# Patient Record
Sex: FEMALE | Race: WHITE | Employment: OTHER | URBAN - METROPOLITAN AREA
[De-identification: names, ages, dates, MRNs, and addresses within clinical notes are randomized per-mention and may not be internally consistent; named-entity substitution may affect disease eponyms.]

---

## 2017-01-11 PROBLEM — H25.811 COMBINED FORMS OF AGE-RELATED CATARACT OF RIGHT EYE: Status: ACTIVE | Noted: 2017-01-11

## 2017-01-16 NOTE — PERIOP NOTES
[facility]  Ambulatory Surgery Unit  Pre-operative Instructions    Surgery/Procedure Date  01/18/2017            Tentative Arrival Time 0645      1. On the day of your surgery/procedure, please report to the Ambulatory Surgery Unit Registration Desk and sign in at your designated time. The Ambulatory Surgery Unit is located in [facility] on the [facility] side of the [facility] across from the [address] [place] [address]. Please have all of your health insurance cards and a photo ID. 2. You must have someone with you to drive you home, as you should not drive a car for 24 hours following anesthesia. Please make arrangements for a responsible adult friend or family member to stay with you for at least the first 24 hours after your surgery. 3. Do not have anything to eat or drink (including water, gum, mints, coffee, juice) after midnight   01/17/2017  . This may not apply to medications prescribed by your physician. (Please note below the special instructions with medications to take the morning of surgery, if applicable.)    4. We recommend you do not drink any alcoholic beverages for 24 hours before and after your surgery. 5. Stop all Aspirin and non-steroidal anti-inflammatory drugs (i.e. Advil, Aleve) as directed by your surgeon's office. Stop all vitamins and herbal supplements seven days prior to your surgery. If you are currently taking Plavix, Coumadin, or other blood-thinning agents, contact your surgeon for instructions. 6. In an effort to help prevent surgical site infection, we ask that you shower with an anti-bacterial soap (i.e. Dial or Safeguard) for 3 days prior to and on the morning of surgery, using a fresh towel after each shower. Do not apply any lotions, powders or deodorants after the shower on the day of your procedure. If applicable, please do not shave the operative site for 48 hours prior to surgery. 7. Wear comfortable clothes.   Wear glasses instead of contacts. Do not bring any money or jewelry. Do not wear make-up, particularly mascara, the morning of your surgery. Do not wear nail polish, particularly if you are having foot /hand surgery. Wear your hair loose or down, no ponytails, buns, malcolm pins or clips. All body piercings must be removed. 8. You should understand that if you do not follow these instructions your surgery may be cancelled. If your physical condition changes (i.e. fever, cold or flu) please contact your surgeon as soon as possible. 9. It is important that you be on time. If a situation occurs where you may be late, or if you have any questions or problems, please call [phone].    10. Your surgery time may be subject to change. You will receive a phone call the day prior to surgery to confirm your arrival time. 11. Pediatric patients: please bring a change of clothes, diapers, bottle/sippy cup, pacifier, etc.      Special Instructions:    MEDICATIONS TO TAKE THE MORNING OF SURGERY WITH A SIP OF WATER: Vigamox      I understand a pre-operative phone call will be made to verify my surgery time. In the event that I am not available, I give permission for a message to be left on my answering service and/or with another person? yes         ___________________      ___________________  _________  Pt verbalized understanding of preop instructions via telephone.   (Signature of Patient)          (Witness)                              (Date and Time)

## 2017-01-17 ENCOUNTER — ANESTHESIA EVENT (OUTPATIENT)
Dept: SURGERY | Age: 72
End: 2017-01-17
Payer: MEDICARE

## 2017-01-18 ENCOUNTER — HOSPITAL ENCOUNTER (OUTPATIENT)
Age: 72
Setting detail: OUTPATIENT SURGERY
Discharge: HOME | End: 2017-01-18
Attending: OPHTHALMOLOGY | Admitting: OPHTHALMOLOGY
Payer: MEDICARE

## 2017-01-18 ENCOUNTER — SURGERY (OUTPATIENT)
Age: 72
End: 2017-01-18

## 2017-01-18 ENCOUNTER — ANESTHESIA (OUTPATIENT)
Dept: SURGERY | Age: 72
End: 2017-01-18
Payer: MEDICARE

## 2017-01-18 VITALS
SYSTOLIC BLOOD PRESSURE: 174 MMHG | OXYGEN SATURATION: 98 % | TEMPERATURE: 98.6 F | HEART RATE: 61 BPM | HEIGHT: 62 IN | BODY MASS INDEX: 32.94 KG/M2 | RESPIRATION RATE: 21 BRPM | WEIGHT: 179 LBS | DIASTOLIC BLOOD PRESSURE: 80 MMHG

## 2017-01-18 PROBLEM — H25.812 COMBINED FORMS OF AGE-RELATED CATARACT OF LEFT EYE: Status: ACTIVE | Noted: 2017-01-18

## 2017-01-18 PROBLEM — H25.811 COMBINED FORMS OF AGE-RELATED CATARACT OF RIGHT EYE: Status: RESOLVED | Noted: 2017-01-11 | Resolved: 2017-01-18

## 2017-01-18 PROCEDURE — V2632 POST CHMBR INTRAOCULAR LENS: HCPCS | Performed by: OPHTHALMOLOGY

## 2017-01-18 PROCEDURE — 76060000073 HC AMB SURG ANES FIRST 0.5 HR: Performed by: OPHTHALMOLOGY

## 2017-01-18 PROCEDURE — 76030000012 HC AMB SURG OR TIME 6 TO 6.5: Performed by: OPHTHALMOLOGY

## 2017-01-18 PROCEDURE — 76030000002 HC AMB SURG OR TIME FIRST 0.: Performed by: OPHTHALMOLOGY

## 2017-01-18 PROCEDURE — 76210000046 HC AMBSU PH II REC FIRST 0.5 HR: Performed by: OPHTHALMOLOGY

## 2017-01-18 PROCEDURE — 76060000074: Performed by: OPHTHALMOLOGY

## 2017-01-18 PROCEDURE — 74011000250 HC RX REV CODE- 250: Performed by: OPHTHALMOLOGY

## 2017-01-18 PROCEDURE — 74011250636 HC RX REV CODE- 250/636: Performed by: OPHTHALMOLOGY

## 2017-01-18 PROCEDURE — 74011250636 HC RX REV CODE- 250/636

## 2017-01-18 PROCEDURE — 77030018846 HC SOL IRR STRL H20 ICUM -A: Performed by: OPHTHALMOLOGY

## 2017-01-18 PROCEDURE — 76060000071 HC AMB SURG ANES 6 TO 6.5 HR: Performed by: OPHTHALMOLOGY

## 2017-01-18 DEVICE — LENS IOL POST 1-PC 6X13 18.5 -- ACRYSOF: Type: IMPLANTABLE DEVICE | Site: EYE | Status: FUNCTIONAL

## 2017-01-18 RX ORDER — FENTANYL CITRATE 50 UG/ML
25 INJECTION, SOLUTION INTRAMUSCULAR; INTRAVENOUS
Status: DISCONTINUED | OUTPATIENT
Start: 2017-01-18 | End: 2017-01-18 | Stop reason: HOSPADM

## 2017-01-18 RX ORDER — SODIUM CHLORIDE, SODIUM LACTATE, POTASSIUM CHLORIDE, CALCIUM CHLORIDE 600; 310; 30; 20 MG/100ML; MG/100ML; MG/100ML; MG/100ML
25 INJECTION, SOLUTION INTRAVENOUS CONTINUOUS
Status: DISCONTINUED | OUTPATIENT
Start: 2017-01-18 | End: 2017-01-18 | Stop reason: HOSPADM

## 2017-01-18 RX ORDER — LIDOCAINE HYDROCHLORIDE 10 MG/ML
0.5 INJECTION, SOLUTION EPIDURAL; INFILTRATION; INTRACAUDAL; PERINEURAL ONCE
Status: COMPLETED | OUTPATIENT
Start: 2017-01-18 | End: 2017-01-18

## 2017-01-18 RX ORDER — GUAIFENESIN 100 MG/5ML
81 LIQUID (ML) ORAL DAILY
COMMUNITY
End: 2020-01-27

## 2017-01-18 RX ORDER — PHENYLEPHRINE HYDROCHLORIDE 25 MG/ML
1 SOLUTION/ DROPS OPHTHALMIC
Status: COMPLETED | OUTPATIENT
Start: 2017-01-18 | End: 2017-01-18

## 2017-01-18 RX ORDER — SODIUM CHLORIDE 0.9 % (FLUSH) 0.9 %
5-10 SYRINGE (ML) INJECTION AS NEEDED
Status: DISCONTINUED | OUTPATIENT
Start: 2017-01-18 | End: 2017-01-18 | Stop reason: HOSPADM

## 2017-01-18 RX ORDER — DICLOFENAC SODIUM 1 MG/ML
1 SOLUTION/ DROPS OPHTHALMIC
Status: COMPLETED | OUTPATIENT
Start: 2017-01-18 | End: 2017-01-18

## 2017-01-18 RX ORDER — MOXIFLOXACIN 5 MG/ML
1 SOLUTION/ DROPS OPHTHALMIC 4 TIMES DAILY
COMMUNITY
End: 2020-01-27

## 2017-01-18 RX ORDER — PREDNISOLONE ACETATE 10 MG/ML
1 SUSPENSION/ DROPS OPHTHALMIC 2 TIMES DAILY
Status: DISCONTINUED | OUTPATIENT
Start: 2017-01-18 | End: 2017-01-18 | Stop reason: HOSPADM

## 2017-01-18 RX ORDER — GENTAMICIN SULFATE 0.3 %
0.5 OINTMENT (GRAM) OPHTHALMIC (EYE) 3 TIMES DAILY
Status: DISCONTINUED | OUTPATIENT
Start: 2017-01-18 | End: 2017-01-18 | Stop reason: HOSPADM

## 2017-01-18 RX ORDER — CYCLOPENTOLATE HYDROCHLORIDE 20 MG/ML
1 SOLUTION/ DROPS OPHTHALMIC
Status: COMPLETED | OUTPATIENT
Start: 2017-01-18 | End: 2017-01-18

## 2017-01-18 RX ORDER — LIDOCAINE HYDROCHLORIDE 10 MG/ML
0.1 INJECTION, SOLUTION EPIDURAL; INFILTRATION; INTRACAUDAL; PERINEURAL AS NEEDED
Status: DISCONTINUED | OUTPATIENT
Start: 2017-01-18 | End: 2017-01-18 | Stop reason: HOSPADM

## 2017-01-18 RX ORDER — MIDAZOLAM HYDROCHLORIDE 1 MG/ML
INJECTION, SOLUTION INTRAMUSCULAR; INTRAVENOUS AS NEEDED
Status: DISCONTINUED | OUTPATIENT
Start: 2017-01-18 | End: 2017-01-18 | Stop reason: HOSPADM

## 2017-01-18 RX ORDER — DIPHENHYDRAMINE HYDROCHLORIDE 50 MG/ML
12.5 INJECTION, SOLUTION INTRAMUSCULAR; INTRAVENOUS AS NEEDED
Status: DISCONTINUED | OUTPATIENT
Start: 2017-01-18 | End: 2017-01-18 | Stop reason: HOSPADM

## 2017-01-18 RX ORDER — SODIUM CHLORIDE 9 MG/ML
25 INJECTION, SOLUTION INTRAVENOUS CONTINUOUS
Status: DISCONTINUED | OUTPATIENT
Start: 2017-01-18 | End: 2017-01-18 | Stop reason: HOSPADM

## 2017-01-18 RX ORDER — LIDOCAINE HYDROCHLORIDE 40 MG/ML
3 INJECTION, SOLUTION RETROBULBAR; TOPICAL ONCE
Status: COMPLETED | OUTPATIENT
Start: 2017-01-18 | End: 2017-01-18

## 2017-01-18 RX ORDER — SODIUM CHLORIDE 0.9 % (FLUSH) 0.9 %
5-10 SYRINGE (ML) INJECTION EVERY 8 HOURS
Status: DISCONTINUED | OUTPATIENT
Start: 2017-01-18 | End: 2017-01-18 | Stop reason: HOSPADM

## 2017-01-18 RX ORDER — PROPARACAINE HYDROCHLORIDE 5 MG/ML
1 SOLUTION/ DROPS OPHTHALMIC
Status: COMPLETED | OUTPATIENT
Start: 2017-01-18 | End: 2017-01-18

## 2017-01-18 RX ORDER — ONDANSETRON 2 MG/ML
4 INJECTION INTRAMUSCULAR; INTRAVENOUS AS NEEDED
Status: DISCONTINUED | OUTPATIENT
Start: 2017-01-18 | End: 2017-01-18 | Stop reason: HOSPADM

## 2017-01-18 RX ADMIN — PHENYLEPHRINE HYDROCHLORIDE 1 DROP: 25 SOLUTION/ DROPS OPHTHALMIC at 06:58

## 2017-01-18 RX ADMIN — DICLOFENAC SODIUM 1 DROP: 1 SOLUTION/ DROPS OPHTHALMIC at 06:54

## 2017-01-18 RX ADMIN — LIDOCAINE HYDROCHLORIDE 3 ML: 40 INJECTION, SOLUTION RETROBULBAR; TOPICAL at 07:59

## 2017-01-18 RX ADMIN — CYCLOPENTOLATE HYDROCHLORIDE 1 DROP: 20 SOLUTION/ DROPS OPHTHALMIC at 06:58

## 2017-01-18 RX ADMIN — CYCLOPENTOLATE HYDROCHLORIDE 1 DROP: 20 SOLUTION/ DROPS OPHTHALMIC at 07:07

## 2017-01-18 RX ADMIN — PREDNISOLONE ACETATE 1 DROP: 10 SUSPENSION/ DROPS OPHTHALMIC at 08:01

## 2017-01-18 RX ADMIN — PROPARACAINE HYDROCHLORIDE 1 DROP: 5 SOLUTION/ DROPS OPHTHALMIC at 07:13

## 2017-01-18 RX ADMIN — APRACLONIDINE HYDROCHLORIDE 1 DROP: 10 SOLUTION/ DROPS OPHTHALMIC at 07:59

## 2017-01-18 RX ADMIN — PROPARACAINE HYDROCHLORIDE 1 DROP: 5 SOLUTION/ DROPS OPHTHALMIC at 07:06

## 2017-01-18 RX ADMIN — DICLOFENAC SODIUM 1 DROP: 1 SOLUTION/ DROPS OPHTHALMIC at 07:06

## 2017-01-18 RX ADMIN — SODIUM CHLORIDE 25 ML/HR: 900 INJECTION, SOLUTION INTRAVENOUS at 06:51

## 2017-01-18 RX ADMIN — MIDAZOLAM HYDROCHLORIDE 1 MG: 1 INJECTION, SOLUTION INTRAMUSCULAR; INTRAVENOUS at 07:59

## 2017-01-18 RX ADMIN — Medication 1 ML: at 07:59

## 2017-01-18 RX ADMIN — DICLOFENAC SODIUM 1 DROP: 1 SOLUTION/ DROPS OPHTHALMIC at 07:07

## 2017-01-18 RX ADMIN — PHENYLEPHRINE HYDROCHLORIDE 1 DROP: 25 SOLUTION/ DROPS OPHTHALMIC at 06:53

## 2017-01-18 RX ADMIN — PROPARACAINE HYDROCHLORIDE 1 DROP: 5 SOLUTION/ DROPS OPHTHALMIC at 06:53

## 2017-01-18 RX ADMIN — DICLOFENAC SODIUM 1 DROP: 1 SOLUTION/ DROPS OPHTHALMIC at 06:58

## 2017-01-18 RX ADMIN — CYCLOPENTOLATE HYDROCHLORIDE 1 DROP: 20 SOLUTION/ DROPS OPHTHALMIC at 07:05

## 2017-01-18 RX ADMIN — CYCLOPENTOLATE HYDROCHLORIDE 1 DROP: 20 SOLUTION/ DROPS OPHTHALMIC at 06:54

## 2017-01-18 RX ADMIN — PROPARACAINE HYDROCHLORIDE 1 DROP: 5 SOLUTION/ DROPS OPHTHALMIC at 07:05

## 2017-01-18 RX ADMIN — PHENYLEPHRINE HYDROCHLORIDE 1 DROP: 25 SOLUTION/ DROPS OPHTHALMIC at 07:05

## 2017-01-18 RX ADMIN — GENTAMICIN SULFATE 0.5 INCH: 3 OINTMENT OPHTHALMIC at 08:00

## 2017-01-18 RX ADMIN — PHENYLEPHRINE HYDROCHLORIDE 1 DROP: 25 SOLUTION/ DROPS OPHTHALMIC at 07:07

## 2017-01-18 RX ADMIN — MIDAZOLAM HYDROCHLORIDE 1 MG: 1 INJECTION, SOLUTION INTRAMUSCULAR; INTRAVENOUS at 07:44

## 2017-01-18 RX ADMIN — BALANCED SALT SOLUTION 1 BOTTLE: 6.4; .75; .48; .3; 3.9; 1.7 SOLUTION OPHTHALMIC at 07:59

## 2017-01-18 RX ADMIN — LIDOCAINE HYDROCHLORIDE 0.5 ML: 10 INJECTION, SOLUTION EPIDURAL; INFILTRATION; INTRACAUDAL; PERINEURAL at 07:59

## 2017-01-18 RX ADMIN — PROPARACAINE HYDROCHLORIDE 1 DROP: 5 SOLUTION/ DROPS OPHTHALMIC at 06:58

## 2017-01-18 RX ADMIN — ACETYLCHOLINE CHLORIDE 20 MG: KIT at 07:59

## 2017-01-18 RX ADMIN — VANCOMYCIN HYDROCHLORIDE: 1 INJECTION, POWDER, LYOPHILIZED, FOR SOLUTION INTRAVENOUS at 08:01

## 2017-01-18 NOTE — DISCHARGE INSTRUCTIONS
Laisha Crowley D.O., JOYCE  [facility].  [phone]    Post-Operative Instructions for Cataract Surgery     Remove your eye shield and bandage at 12 noon the same day as surgery and start your eye drops. THROW AWAY THE GAUZE UNDER THE SHIELD.  Place the blue eye shield back on for one week while asleep, even if napping in the recliner. Use the tape included in your bag.  DO NOT RUB YOUR EYE EVER! DO NOT WIPE YOUR EYE! ONLY WIPE ON YOUR CHEEK!                DO NOT WEAR EYE MAKEUP FOR 1 WEEK!  You may take a bath today and you can shower starting tomorrow.  You may resume your previous diet.  If you use glaucoma drops in the operative eye, continue them as directed.  Common symptoms after surgery include a scratchy feeling, slight headache, red eye, and/or blurred vision. You may use Advil or Tylenol for any discomfort.  Avoid strenuous activities and driving until you see Dr. Kandy Hernandez tomorrow. Please use care when walking, your depth perception may be altered.  Bring your bag with your drops to your follow up appointment. Below are Instructions On How To Use Your Eye Drops. ON THE DAY OF SURGERY:     Use all three eye drops at 12 noon, 4pm, and 8pm.  Wait 10 minutes between drops. THE DAY AFTER SURGERY:     You will use the drops 4 times a day at 8am, 12 noon, 4pm, and 8pm.   Use the drops every day until bottles are empty. Vigamox (tan top) Put 1 drop in at 8am, 12 noon, 4pm, and 8pm.    Pred Acetate (pink top) Put 1 drop in at 8:10am, 12:10pm, 4:10pm, and 8:10pm. Shake before using. Diclofenac Put 1 drop in at 8:20am, 12:20pm, 4:20pm, 8:20pm.    CONTINUE DROPS UNTIL ALL BOTTLES ARE EMPTY! Follow-up appointment tomorrow at Dr. Meme Roblero office:__________9:00__________    Please call the office at [phone] if you experience severe pain or nausea.        DISCHARGE SUMMARY from Nurse    The following personal items collected during your admission are returned to you:   Dental Appliance: Dental Appliances: Partials, Uppers, With patient  Vision: Visual Aid: Glasses, At home (reading glasses)  Hearing Aid:    Jewelry: Jewelry: None  Clothing: Clothing: Footwear (under stretcher)  Other Valuables: Other Valuables: None  Valuables sent to safe:      PATIENT INSTRUCTIONS:    After general anesthesia or intravenous sedation, for 24 hours or while taking prescription arcotics:  · Someone should be with you for the next 24 hours. · For your own safety, a responsible adult must drive you home. · Limit your activities  · Recommended activity: Rest today and no driving until after your appointment with Dr. Lakeisha Mccormick tomorrow. · Do not drive and operate hazardous machinery  · Do not make important personal or business decisions  · Do  not drink alcoholic beverages  · If you have not urinated within 8 hours after discharge, please contact your surgeon on call. Report the following to your surgeon:  · Excessive pain, swelling, redness or odor of or around the surgical area  · Temperature over 100.5  · Any nausea and vomiting   ·   · You will receive a Post Operative Call from one of the Recovery Room Nurses on the day after your surgery to check on you. It is very important for us to know how you are recovering after your surgery. · You may receive an e-mail or letter in the mail from [place] regarding your experience with us in the Ambulatory Surgery Unit. Your feedback is valuable to us and we appreciate your participation in the survey. ·   ·     · If the above instructions are not adequate, please contact Yumiko Brito RN, [place] anesthesia Nurse Manager or our Anesthesiologist, at [phone]. ·   · We wish you a speedy recovery ? What to do at Home:    *  Please give a list of your current medications to your Primary Care Provider.     *  Please update this list whenever your medications are discontinued, doses are changed, or new medications (including over-the-counter products) are added. *  Please carry medication information at all times in case of emergency situations. These are general instructions for a healthy lifestyle:    No smoking/ No tobacco products/ Avoid exposure to second hand smoke    Surgeon General's Warning:  Quitting smoking now greatly reduces serious risk to your health. Obesity, smoking, and sedentary lifestyle greatly increases your risk for illness    A healthy diet, regular physical exercise & weight monitoring are important for maintaining a healthy lifestyle    You may be retaining fluid if you have a history of heart failure or if you experience any of the following symptoms:  Weight gain of 3 pounds or more overnight or 5 pounds in a week, increased swelling in our hands or feet or shortness of breath while lying flat in bed. Please call your doctor as soon as you notice any of these symptoms; do not wait until your next office visit. Recognize signs and symptoms of STROKE:    F-face looks uneven    A-arms unable to move or move even    S-speech slurred or non-existent    T-time-call 911 as soon as signs and symptoms begin-DO NOT go       Back to bed or wait to see if you get better-TIME IS BRAIN. If you have not had your influenza or pneumococcal vaccines, please follow up with your primary care physician. The discharge information has been reviewed with the patient and caregiver. The patient and caregiver verbalized understanding.

## 2017-01-18 NOTE — ANESTHESIA POSTPROCEDURE EVALUATION
Post-Anesthesia Evaluation and Assessment    Patient: Lupe Kovacs MRN: [id number]  SSN: [id number]    YOB: 1945  Age: 70 y.o. Sex: female       Cardiovascular Function/Vital Signs  Visit Vitals    /80    Pulse 61    Temp 37 °C (98.6 °F)    Resp 21    Ht 5' 2\" (1.575 m)    Wt 81.2 kg (179 lb)    SpO2 98%    BMI 32.74 kg/m2       Patient is status post MAC anesthesia for Procedure(s):  CATARACT EXTRACTION WITH INTRA OCULAR LENS IMPLANT RIGHT EYE. Nausea/Vomiting: None    Postoperative hydration reviewed and adequate. Pain:  Pain Scale 1: Numeric (0 - 10) (01/18/17 0816)  Pain Intensity 1: 0 (01/18/17 0816)   Managed    Neurological Status:   Neuro (WDL): Exceptions to WDL (01/18/17 1085)  Neuro  Neurologic State: Alert (01/18/17 0814)  LUE Motor Response: Purposeful (01/18/17 0814)  LLE Motor Response: Purposeful (01/18/17 0814)  RUE Motor Response: Purposeful (01/18/17 7624)  RLE Motor Response: Purposeful (01/18/17 0814)   At baseline    Mental Status and Level of Consciousness: Arousable    Pulmonary Status:   O2 Device: Room air (Simultaneous filing. User may not have seen previous data.) (01/18/17 0814)   Adequate oxygenation and airway patent    Complications related to anesthesia: None    Post-anesthesia assessment completed.  No concerns    Signed By: Quinton Hardin MD     January 18, 2017

## 2017-01-18 NOTE — ANESTHESIA PREPROCEDURE EVALUATION
Anesthetic History   No history of anesthetic complications            Review of Systems / Medical History  Patient summary reviewed, nursing notes reviewed and pertinent labs reviewed    Pulmonary  Within defined limits                 Neuro/Psych   Within defined limits           Cardiovascular    Hypertension              Exercise tolerance: >4 METS     GI/Hepatic/Renal  Within defined limits              Endo/Other  Within defined limits           Other Findings              Physical Exam    Airway  Mallampati: III  TM Distance: 4 - 6 cm  Neck ROM: normal range of motion   Mouth opening: Normal     Cardiovascular    Rhythm: regular  Rate: normal      Pertinent negatives: No murmur   Dental    Dentition: Bridges  Comments: Upper left 4 molars   Pulmonary  Breath sounds clear to auscultation               Abdominal  GI exam deferred       Other Findings            Anesthetic Plan    ASA: 2  Anesthesia type: MAC          Induction: Intravenous  Anesthetic plan and risks discussed with: Patient

## 2017-01-18 NOTE — IP AVS SNAPSHOT
[address] [facility] 
[phone] Patient: Diann Perez MRN: [id number] UBA:8/3/1982 You are allergic to the following Allergen Reactions Cefuroxime Other (comments) Tightness in abd Recent Documentation Height Weight BMI OB Status Smoking Status 1.575 m 81.2 kg 32.74 kg/m2 Hysterectomy Never Smoker Emergency Contacts Name Discharge Info Relation Home Work Mobile [address] CAREGIVER [3] Child [2] [phone] About your hospitalization You were admitted on:  January 18, 2017 You last received care in the:  [facility] [employer] You were discharged on:  January 18, 2017 Unit phone number:  [phone] Why you were hospitalized Your primary diagnosis was:  Combined Forms Of Age-Related Cataract Of Right Eye  
  
  
 
  
  
Providers Seen During Your Hospitalizations Provider Role Specialty Primary office phone Juma Hensley DO Attending Provider Ophthalmology [phone] Your Primary Care Physician (PCP) Primary Care Physician Office Phone Office Fax Edyta Jimenez [phone] Follow-up Information Follow up With Details Comments Contact Info Leticia Nageotte, MD   [address] [address] 
[phone] Your Appointments Wednesday January 18, 2017 CATARACT EXTRACTION WITH INTRA OCULAR LENS IMPLANT with Juma Hensley DO  
[facility] AMB SURGERY UNIT (RI OR PRE ASSESSMENT) [address] [facility]  
[phone] Wednesday January 25, 2017 CATARACT EXTRACTION WITH INTRA OCULAR LENS IMPLANT with Juma Hensley DO  
[facility] AMB SURGERY UNIT (RI OR PRE ASSESSMENT) [address] [place] [address]  
[phone] Current Discharge Medication List  
  
ASK your doctor about these medications Dose & Instructions Dispensing Information Comments Morning Noon Evening Bedtime  
 aspirin 81 mg chewable tablet Your next dose is: Today, Tomorrow Other:  _________ Dose:  81 mg Take 81 mg by mouth daily. Refills:  0  
     
   
   
   
  
 lisinopril 40 mg tablet Commonly known as:  Marilynne Shows Your next dose is: Today, Tomorrow Other:  _________ Dose:  40 mg Take 40 mg by mouth daily. Refills:  0 METAMUCIL PO Your next dose is: Today, Tomorrow Other:  _________ Take  by mouth. Refills:  0  
     
   
   
   
  
 VIGAMOX 0.5 % ophthalmic solution Generic drug:  moxifloxacin Your next dose is: Today, Tomorrow Other:  _________ Dose:  1 Drop Administer 1 Drop to right eye three (3) times daily. Refills:  0 ZyrTEC 10 mg Cap Generic drug:  Cetirizine Your next dose is: Today, Tomorrow Other:  _________ Take  by mouth. Refills:  0 Discharge Instructions Mary Ann ANA LILIA Greene, P.CMitchel 
[facility] [address]. 
[phone] Post-Operative Instructions for Cataract Surgery ? Remove your eye shield and bandage at 12 noon the same day as surgery and start your eye drops. THROW AWAY THE GAUZE UNDER THE SHIELD. ? Place the blue eye shield back on for one week while asleep, even if napping in the recliner. Use the tape included in your bag.   
       
 
? DO NOT RUB YOUR EYE EVER! DO NOT WIPE YOUR EYE! ONLY WIPE ON YOUR CHEEK! 
 
            DO NOT WEAR EYE MAKEUP FOR 1 WEEK! 
 
 
? You may take a bath today and you can shower starting tomorrow. ? You may resume your previous diet. ? If you use glaucoma drops in the operative eye, continue them as directed.  
 
? Common symptoms after surgery include a scratchy feeling, slight headache, red eye, and/or blurred vision. You may use Advil or Tylenol for any discomfort. ? Avoid strenuous activities and driving until you see Dr. Megan Gordillo tomorrow. Please use care when walking, your depth perception may be altered. ? Bring your bag with your drops to your follow up appointment. Below are Instructions On How To Use Your Eye Drops. ON THE DAY OF SURGERY: 
 
? Use all three eye drops at 12 noon, 4pm, and 8pm.  Wait 10 minutes between drops. THE DAY AFTER SURGERY: 
 
? You will use the drops 4 times a day at 8am, 12 noon, 4pm, and 8pm. 
? Use the drops every day until bottles are empty. Vigamox (tan top) Put 1 drop in at 8am, 12 noon, 4pm, and 8pm. 
 
Pred Acetate (pink top) Put 1 drop in at 8:10am, 12:10pm, 4:10pm, and 8:10pm. Shake before using. Diclofenac Put 1 drop in at 8:20am, 12:20pm, 4:20pm, 8:20pm. 
 
CONTINUE DROPS UNTIL ALL BOTTLES ARE EMPTY! Follow-up appointment tomorrow at Dr. Alta Lester office:__________9:00__________ Please call the office at [phone] if you experience severe pain or nausea. DISCHARGE SUMMARY from Nurse The following personal items collected during your admission are returned to you:  
Dental Appliance: Dental Appliances: Partials, Uppers, With patient Vision: Visual Aid: Glasses, At home (reading glasses) Hearing Aid:   
Jewelry: Jewelry: None Clothing: Clothing: Footwear (under stretcher) Other Valuables: Other Valuables: None Valuables sent to safe:   
 
PATIENT INSTRUCTIONS: 
 
After general anesthesia or intravenous sedation, for 24 hours or while taking prescription arcotics: · Someone should be with you for the next 24 hours. · For your own safety, a responsible adult must drive you home. · Limit your activities · Recommended activity: Rest today and no driving until after your appointment with Dr. Megan Gordillo tomorrow. · Do not drive and operate hazardous machinery · Do not make important personal or business decisions · Do  not drink alcoholic beverages · If you have not urinated within 8 hours after discharge, please contact your surgeon on call. Report the following to your surgeon: 
· Excessive pain, swelling, redness or odor of or around the surgical area · Temperature over 100.5 · Any nausea and vomiting ·  
· You will receive a Post Operative Call from one of the Recovery Room Nurses on the day after your surgery to check on you. It is very important for us to know how you are recovering after your surgery. · You may receive an e-mail or letter in the mail from [place] regarding your experience with us in the Ambulatory Surgery Unit. Your feedback is valuable to us and we appreciate your participation in the survey. ·  
·  
 
· If the above instructions are not adequate, please contact Robbie Cifuentes RN, [place] anesthesia Nurse Manager or our Anesthesiologist, at [phone]. ·  
· We wish you a speedy recovery ? What to do at Home: *  Please give a list of your current medications to your Primary Care Provider. *  Please update this list whenever your medications are discontinued, doses are 
    changed, or new medications (including over-the-counter products) are added. *  Please carry medication information at all times in case of emergency situations. These are general instructions for a healthy lifestyle: No smoking/ No tobacco products/ Avoid exposure to second hand smoke Surgeon General's Warning:  Quitting smoking now greatly reduces serious risk to your health. Obesity, smoking, and sedentary lifestyle greatly increases your risk for illness A healthy diet, regular physical exercise & weight monitoring are important for maintaining a healthy lifestyle You may be retaining fluid if you have a history of heart failure or if you experience any of the following symptoms:  Weight gain of 3 pounds or more overnight or 5 pounds in a week, increased swelling in our hands or feet or shortness of breath while lying flat in bed. Please call your doctor as soon as you notice any of these symptoms; do not wait until your next office visit. Recognize signs and symptoms of STROKE: 
 
F-face looks uneven A-arms unable to move or move even S-speech slurred or non-existent T-time-call 911 as soon as signs and symptoms begin-DO NOT go Back to bed or wait to see if you get better-TIME IS BRAIN. If you have not had your influenza or pneumococcal vaccines, please follow up with your primary care physician. The discharge information has been reviewed with the patient and caregiver. The patient and caregiver verbalized understanding. Discharge Orders None Introducing [facility] SERVICES! Veronika Escalona introduces [employer] patient portal. Now you can access parts of your medical record, email your doctor's office, and request medication refills online. 1. In your internet browser, go to [email or web address] 2. Click on the First Time User? Click Here link in the Sign In box. You will see the New Member Sign Up page. 3. Enter your [employer] Access Code exactly as it appears below. You will not need to use this code after youve completed the sign-up process. If you do not sign up before the expiration date, you must request a new code. · [employer] Access Code: [email or web address] Expires: 4/5/2017  1:07 PM 
 
4. Enter the last four digits of your Social Security Number (xxxx) and Date of Birth (mm/dd/yyyy) as indicated and click Submit. You will be taken to the next sign-up page. 5. Create a Star Anal[employer] ID. This will be your [employer] login ID and cannot be changed, so think of one that is secure and easy to remember. 6. Create a [employer] password. You can change your password at any time. 7. Enter your Password Reset Question and Answer.  This can be used at a later time if you forget your password. 8. Enter your e-mail address. You will receive e-mail notification when new information is available in [address] 9. Click Sign Up. You can now view and download portions of your medical record. 10. Click the Download Summary menu link to download a portable copy of your medical information. If you have questions, please visit the Frequently Asked Questions section of the [employer] website. Remember, [employer] is NOT to be used for urgent needs. For medical emergencies, dial 911. Now available from your iPhone and Android! General Information Please provide this summary of care documentation to your next provider. Patient Signature:  ____________________________________________________________ Date:  ____________________________________________________________  
  
Nicole Perez Provider Signature:  ____________________________________________________________ Date:  ____________________________________________________________

## 2017-01-18 NOTE — PERIOP NOTES
Colby Gee  1945  [id number]    Situation:  Verbal report given from: Nithya ONEAL and KKgeorgette REYEZ  Procedure: Procedure(s):  CATARACT EXTRACTION WITH INTRA OCULAR LENS IMPLANT RIGHT EYE    Background:    Preoperative diagnosis: Combined form of age-related cataract, right eye [H25.811]    Postoperative diagnosis: Combined form of age-related cataract, right eye [H25.811]    :  Dr. Abbe Allen    Assistant(s): Circ-1: Rob Mayorga RN  Scrub Tech-1: [facility]    Specimens: * No specimens in log *    Assessment:  Intra-procedure medications         Anesthesia gave intra-procedure sedation and medications, see anesthesia flow sheet     Intravenous fluids: LR@ KVO     Vital signs stable       Recommendation:    Permission to share finding with family or friend yes    [phone] D/C to home, accompanied to car per RN.  Eye shield intact to right eye

## 2017-01-18 NOTE — OP NOTES
Name: Conley Sandhoff MASC-4        updated 3/1/2013  Description:  Edgar Lau with intraocular lens implant    PREOPERATIVE DIAGNOSIS: Visually impairing combined cataract, Right eye. POSTOPERATIVE DIAGNOSIS: Visually impairing combined   cataract,Right eye. OPERATION: Procedure(s):  CATARACT EXTRACTION WITH INTRA OCULAR LENS IMPLANT RIGHT EYE    ANESTHESIA: Topical with intravenous sedation    TYPE OF LENS IMPLANT USED:   Implant Name Type Inv. Item Serial No.  Lot No. LRB No. Used Action   LENS DIOPTER SN60WF 18.5 --  - [id number]   LENS DIOPTER SN60WF 18.5 --  [id number] [employer]   Right 1 Implanted       PHACO TIME:   47 seconds at an average power of 16%. Estimated blood loss: None    Complications:None    Specimen removed: None    DESCRIPTION OF PROCEDURE:  The patient was brought to the holding area, where an IV was begun at the keep open rate. The patient received several instillations of Antelmo-Synephrine 2.5%, Cyclogyl 2%, and tetracaine 0.5% until adequate pupillary dilatation was achieved. The patient was connected to cardiovascular monitoring. Prior to being brought back to the operating suite, the patient received 2 drops of Betadine 5% solution into the inferior cul-de-sac of the operated eye. The patient was then brought back to the operating suite, and prepped and draped in the usual sterile manner after being re-connnected to cardiovascular monitoring. One drop of 4% Xylocaine was then instilled onto the eye. The lid speculum was set into position and the operating microscope was focused on the patient. Two drops of 4% Xylocaine were again instilled onto the eye. Using the fixation ring, the sharp 15-degree blade was used to create a paracentesis site and 1% MPF Xylocaine was instilled into the anterior chamber for anesthesia. Viscoelastic was then instilled into the anterior chamber.  The 2.4 mm keratome was then utilized to create a clear corneal incision, and a circular capsulorrhexis was performed. BSS was utilized to perform careful hydrodissection of the lens. Viscoelastic was then instilled into the anterior chamber to protect the corneal endothelium. Phacoemulsification was then carried out. Any remaining cortical material was removed in irrigation/aspiration mode. Viscoelastic was then instilled into the capsular bag. The lens implant was inspected for any defects. After finding none, the lens was placed in its injector and inserted into the capsular bag. The lens implant was centered on the patient's visual/astigmatic axis. The viscoelastic was then removed from the eye. Miochol was instilled posterior to the iris plane to facilitate pupillary miosis. The wound was checked for any leaks, after finding none, Iopidine and Pred Forte drops were instilled into the inferior cul-de-sac, and gentamicin ointment was placed over the cornea. A semi-pressure dressing and shield were then placed for the patient. The patient tolerated the procedure very well, and was brought to the recovery room in an alert and stable and satisfactory postoperative condition. Instructions were given to the patient and their family for their immediate postoperative care.   [address] [place] [address],   1/18/2017

## 2017-01-23 RX ORDER — DICLOFENAC SODIUM 1 MG/ML
1 SOLUTION/ DROPS OPHTHALMIC 4 TIMES DAILY
COMMUNITY
End: 2019-04-27

## 2017-01-23 RX ORDER — PREDNISOLONE ACETATE 10 MG/ML
1 SUSPENSION/ DROPS OPHTHALMIC 4 TIMES DAILY
COMMUNITY
End: 2019-04-27

## 2017-01-23 RX ORDER — MOXIFLOXACIN 5 MG/ML
1 SOLUTION/ DROPS OPHTHALMIC 4 TIMES DAILY
COMMUNITY
End: 2019-04-27

## 2017-01-23 NOTE — PERIOP NOTES
[facility]  Ambulatory Surgery Unit  Pre-operative Instructions    Surgery/Procedure Date  1/25            Tentative Arrival Time 0630      1. On the day of your surgery/procedure, please report to the Ambulatory Surgery Unit Registration Desk and sign in at your designated time. The Ambulatory Surgery Unit is located in [facility] on the [facility] side of the \[facility] [place] [facility]\"" across from the [address] [place] [address]. Please have all of your health insurance cards and a photo ID. 2. You must have someone with you to drive you home, as you should not drive a car for 24 hours following anesthesia. Please make arrangements for a responsible adult friend or family member to stay with you for at least the first 24 hours after your surgery. 3. Do not have anything to eat or drink (including water, gum, mints, coffee, juice) after midnight   1/24. This may not apply to medications prescribed by your physician. (Please note below the special instructions with medications to take the morning of surgery, if applicable.)    4. We recommend you do not drink any alcoholic beverages for 24 hours before and after your surgery. 5. Stop all Aspirin, non-steroidal anti-inflammatory drugs (i.e. Advil, Aleve), vitamins, and supplements as directed by your surgeon's office. **If you are currently taking Plavix, Coumadin, or other blood-thinning agents, contact your surgeon for instructions. **    6. In an effort to help prevent surgical site infection, we ask that you shower with an anti-bacterial soap (i.e. Dial or Safeguard)  the morning of surgery, using a fresh towel after each shower. Do not apply any lotions, powders, or deodorants after the shower on the day of your procedure. If applicable, please do not shave the operative site for 48 hours prior to surgery. 7. Wear comfortable clothes. Wear glasses instead of contacts. Do not bring any jewelry or money (other than copays or fees as instructed).  Do not wear make-up, particularly mascara, the morning of your surgery. Do not wear nail polish, particularly if you are having foot /hand surgery. Wear your hair loose or down, no ponytails, buns, malcolm pins or clips. All body piercings must be removed. 8. You should understand that if you do not follow these instructions your surgery may be cancelled. If your physical condition changes (i.e. fever, cold or flu) please contact your surgeon as soon as possible. 9. It is important that you be on time. If a situation occurs where you may be late, or if you have any questions or problems, please call [phone].    10. Your surgery time may be subject to change. You will receive a phone call the day prior to surgery to confirm your arrival time. 11. Pediatric patients: please bring a change of clothes, diapers, bottle/sippy cup, pacifier, etc.      Special Instructions:    MEDICATIONS TO TAKE THE MORNING OF SURGERY WITH A SIP OF WATER: lisinopril and eye drops      I understand a pre-operative phone call will be made to verify my surgery time. In the event that I am not available, I give permission for a message to be left on my answering service and/or with another person?       yes    Reviewed by phone with pr, verbalized understanding     ___________________      ___________________      ________________  (Signature of Patient)          (Witness)                   (Date and Time)

## 2017-01-24 ENCOUNTER — ANESTHESIA EVENT (OUTPATIENT)
Dept: SURGERY | Age: 72
End: 2017-01-24
Payer: MEDICARE

## 2017-01-25 ENCOUNTER — ANESTHESIA (OUTPATIENT)
Dept: SURGERY | Age: 72
End: 2017-01-25
Payer: MEDICARE

## 2017-01-25 ENCOUNTER — HOSPITAL ENCOUNTER (OUTPATIENT)
Age: 72
Setting detail: OUTPATIENT SURGERY
Discharge: HOME | End: 2017-01-25
Attending: OPHTHALMOLOGY | Admitting: OPHTHALMOLOGY
Payer: MEDICARE

## 2017-01-25 ENCOUNTER — SURGERY (OUTPATIENT)
Age: 72
End: 2017-01-25

## 2017-01-25 VITALS
HEIGHT: 62 IN | BODY MASS INDEX: 32.94 KG/M2 | SYSTOLIC BLOOD PRESSURE: 168 MMHG | TEMPERATURE: 98 F | OXYGEN SATURATION: 97 % | HEART RATE: 64 BPM | DIASTOLIC BLOOD PRESSURE: 79 MMHG | RESPIRATION RATE: 17 BRPM | WEIGHT: 179 LBS

## 2017-01-25 PROCEDURE — 76060000073 HC AMB SURG ANES FIRST 0.5 HR: Performed by: OPHTHALMOLOGY

## 2017-01-25 PROCEDURE — 74011250636 HC RX REV CODE- 250/636

## 2017-01-25 PROCEDURE — 77030020255 HC SOL INJ LR 1000ML BG: Performed by: OPHTHALMOLOGY

## 2017-01-25 PROCEDURE — 77030018846 HC SOL IRR STRL H20 ICUM -A: Performed by: OPHTHALMOLOGY

## 2017-01-25 PROCEDURE — 74011250636 HC RX REV CODE- 250/636: Performed by: OPHTHALMOLOGY

## 2017-01-25 PROCEDURE — 76030000002 HC AMB SURG OR TIME FIRST 0.: Performed by: OPHTHALMOLOGY

## 2017-01-25 PROCEDURE — V2632 POST CHMBR INTRAOCULAR LENS: HCPCS | Performed by: OPHTHALMOLOGY

## 2017-01-25 PROCEDURE — 76210000046 HC AMBSU PH II REC FIRST 0.5 HR: Performed by: OPHTHALMOLOGY

## 2017-01-25 PROCEDURE — 74011000250 HC RX REV CODE- 250: Performed by: OPHTHALMOLOGY

## 2017-01-25 DEVICE — LENS IOL POST 1-PC 6X13 19.5 -- ACRYSOF: Type: IMPLANTABLE DEVICE | Site: EYE | Status: FUNCTIONAL

## 2017-01-25 RX ORDER — SODIUM CHLORIDE, SODIUM LACTATE, POTASSIUM CHLORIDE, CALCIUM CHLORIDE 600; 310; 30; 20 MG/100ML; MG/100ML; MG/100ML; MG/100ML
25 INJECTION, SOLUTION INTRAVENOUS CONTINUOUS
Status: DISCONTINUED | OUTPATIENT
Start: 2017-01-25 | End: 2017-01-25 | Stop reason: HOSPADM

## 2017-01-25 RX ORDER — PREDNISOLONE ACETATE 10 MG/ML
1 SUSPENSION/ DROPS OPHTHALMIC 2 TIMES DAILY
Status: DISCONTINUED | OUTPATIENT
Start: 2017-01-25 | End: 2017-01-25 | Stop reason: HOSPADM

## 2017-01-25 RX ORDER — SODIUM CHLORIDE 0.9 % (FLUSH) 0.9 %
5-10 SYRINGE (ML) INJECTION AS NEEDED
Status: DISCONTINUED | OUTPATIENT
Start: 2017-01-25 | End: 2017-01-25 | Stop reason: HOSPADM

## 2017-01-25 RX ORDER — DIPHENHYDRAMINE HYDROCHLORIDE 50 MG/ML
12.5 INJECTION, SOLUTION INTRAMUSCULAR; INTRAVENOUS AS NEEDED
Status: DISCONTINUED | OUTPATIENT
Start: 2017-01-25 | End: 2017-01-25 | Stop reason: HOSPADM

## 2017-01-25 RX ORDER — LIDOCAINE HYDROCHLORIDE 10 MG/ML
0.5 INJECTION, SOLUTION EPIDURAL; INFILTRATION; INTRACAUDAL; PERINEURAL ONCE
Status: COMPLETED | OUTPATIENT
Start: 2017-01-25 | End: 2017-01-25

## 2017-01-25 RX ORDER — DICLOFENAC SODIUM 1 MG/ML
1 SOLUTION/ DROPS OPHTHALMIC
Status: COMPLETED | OUTPATIENT
Start: 2017-01-25 | End: 2017-01-25

## 2017-01-25 RX ORDER — LIDOCAINE HYDROCHLORIDE 40 MG/ML
3 INJECTION, SOLUTION RETROBULBAR; TOPICAL ONCE
Status: COMPLETED | OUTPATIENT
Start: 2017-01-25 | End: 2017-01-25

## 2017-01-25 RX ORDER — ONDANSETRON 2 MG/ML
4 INJECTION INTRAMUSCULAR; INTRAVENOUS AS NEEDED
Status: DISCONTINUED | OUTPATIENT
Start: 2017-01-25 | End: 2017-01-25 | Stop reason: HOSPADM

## 2017-01-25 RX ORDER — SODIUM CHLORIDE 0.9 % (FLUSH) 0.9 %
5-10 SYRINGE (ML) INJECTION EVERY 8 HOURS
Status: DISCONTINUED | OUTPATIENT
Start: 2017-01-25 | End: 2017-01-25 | Stop reason: HOSPADM

## 2017-01-25 RX ORDER — SODIUM CHLORIDE 9 MG/ML
25 INJECTION, SOLUTION INTRAVENOUS CONTINUOUS
Status: DISCONTINUED | OUTPATIENT
Start: 2017-01-25 | End: 2017-01-25 | Stop reason: HOSPADM

## 2017-01-25 RX ORDER — FENTANYL CITRATE 50 UG/ML
25 INJECTION, SOLUTION INTRAMUSCULAR; INTRAVENOUS
Status: DISCONTINUED | OUTPATIENT
Start: 2017-01-25 | End: 2017-01-25 | Stop reason: HOSPADM

## 2017-01-25 RX ORDER — MIDAZOLAM HYDROCHLORIDE 1 MG/ML
INJECTION, SOLUTION INTRAMUSCULAR; INTRAVENOUS AS NEEDED
Status: DISCONTINUED | OUTPATIENT
Start: 2017-01-25 | End: 2017-01-25 | Stop reason: HOSPADM

## 2017-01-25 RX ORDER — PHENYLEPHRINE HYDROCHLORIDE 25 MG/ML
1 SOLUTION/ DROPS OPHTHALMIC
Status: COMPLETED | OUTPATIENT
Start: 2017-01-25 | End: 2017-01-25

## 2017-01-25 RX ORDER — LIDOCAINE HYDROCHLORIDE 10 MG/ML
0.1 INJECTION, SOLUTION EPIDURAL; INFILTRATION; INTRACAUDAL; PERINEURAL AS NEEDED
Status: DISCONTINUED | OUTPATIENT
Start: 2017-01-25 | End: 2017-01-25 | Stop reason: HOSPADM

## 2017-01-25 RX ORDER — GENTAMICIN SULFATE 0.3 %
0.5 OINTMENT (GRAM) OPHTHALMIC (EYE) 3 TIMES DAILY
Status: DISCONTINUED | OUTPATIENT
Start: 2017-01-25 | End: 2017-01-25 | Stop reason: HOSPADM

## 2017-01-25 RX ORDER — CYCLOPENTOLATE HYDROCHLORIDE 20 MG/ML
1 SOLUTION/ DROPS OPHTHALMIC
Status: COMPLETED | OUTPATIENT
Start: 2017-01-25 | End: 2017-01-25

## 2017-01-25 RX ORDER — PROPARACAINE HYDROCHLORIDE 5 MG/ML
1 SOLUTION/ DROPS OPHTHALMIC
Status: DISCONTINUED | OUTPATIENT
Start: 2017-01-25 | End: 2017-01-25 | Stop reason: HOSPADM

## 2017-01-25 RX ADMIN — PHENYLEPHRINE HYDROCHLORIDE 1 DROP: 25 SOLUTION/ DROPS OPHTHALMIC at 06:52

## 2017-01-25 RX ADMIN — PROPARACAINE HYDROCHLORIDE 1 DROP: 5 SOLUTION/ DROPS OPHTHALMIC at 07:18

## 2017-01-25 RX ADMIN — DICLOFENAC SODIUM 1 DROP: 1 SOLUTION/ DROPS OPHTHALMIC at 07:09

## 2017-01-25 RX ADMIN — CYCLOPENTOLATE HYDROCHLORIDE 1 DROP: 20 SOLUTION/ DROPS OPHTHALMIC at 06:58

## 2017-01-25 RX ADMIN — Medication 1 ML: at 08:05

## 2017-01-25 RX ADMIN — CYCLOPENTOLATE HYDROCHLORIDE 1 DROP: 20 SOLUTION/ DROPS OPHTHALMIC at 06:52

## 2017-01-25 RX ADMIN — BALANCED SALT SOLUTION 1 BOTTLE: 6.4; .75; .48; .3; 3.9; 1.7 SOLUTION OPHTHALMIC at 08:05

## 2017-01-25 RX ADMIN — MIDAZOLAM HYDROCHLORIDE 0.5 MG: 1 INJECTION, SOLUTION INTRAMUSCULAR; INTRAVENOUS at 08:06

## 2017-01-25 RX ADMIN — PROPARACAINE HYDROCHLORIDE 1 DROP: 5 SOLUTION/ DROPS OPHTHALMIC at 06:52

## 2017-01-25 RX ADMIN — LIDOCAINE HYDROCHLORIDE 3 ML: 40 INJECTION, SOLUTION RETROBULBAR; TOPICAL at 08:06

## 2017-01-25 RX ADMIN — VANCOMYCIN HYDROCHLORIDE: 1 INJECTION, POWDER, LYOPHILIZED, FOR SOLUTION INTRAVENOUS at 08:06

## 2017-01-25 RX ADMIN — PROPARACAINE HYDROCHLORIDE 1 DROP: 5 SOLUTION/ DROPS OPHTHALMIC at 06:58

## 2017-01-25 RX ADMIN — DICLOFENAC SODIUM 1 DROP: 1 SOLUTION/ DROPS OPHTHALMIC at 07:18

## 2017-01-25 RX ADMIN — PHENYLEPHRINE HYDROCHLORIDE 1 DROP: 25 SOLUTION/ DROPS OPHTHALMIC at 07:09

## 2017-01-25 RX ADMIN — GENTAMICIN SULFATE 0.5 INCH: 3 OINTMENT OPHTHALMIC at 08:05

## 2017-01-25 RX ADMIN — MIDAZOLAM HYDROCHLORIDE 1 MG: 1 INJECTION, SOLUTION INTRAMUSCULAR; INTRAVENOUS at 08:04

## 2017-01-25 RX ADMIN — ACETYLCHOLINE CHLORIDE 20 MG: KIT at 08:05

## 2017-01-25 RX ADMIN — DICLOFENAC SODIUM 1 DROP: 1 SOLUTION/ DROPS OPHTHALMIC at 06:52

## 2017-01-25 RX ADMIN — APRACLONIDINE HYDROCHLORIDE 1 DROP: 10 SOLUTION/ DROPS OPHTHALMIC at 08:05

## 2017-01-25 RX ADMIN — PROPARACAINE HYDROCHLORIDE 1 DROP: 5 SOLUTION/ DROPS OPHTHALMIC at 07:09

## 2017-01-25 RX ADMIN — SODIUM CHLORIDE 25 ML/HR: 900 INJECTION, SOLUTION INTRAVENOUS at 06:53

## 2017-01-25 RX ADMIN — CYCLOPENTOLATE HYDROCHLORIDE 1 DROP: 20 SOLUTION/ DROPS OPHTHALMIC at 07:18

## 2017-01-25 RX ADMIN — LIDOCAINE HYDROCHLORIDE 0.5 ML: 10 INJECTION, SOLUTION EPIDURAL; INFILTRATION; INTRACAUDAL; PERINEURAL at 08:06

## 2017-01-25 RX ADMIN — PHENYLEPHRINE HYDROCHLORIDE 1 DROP: 25 SOLUTION/ DROPS OPHTHALMIC at 07:18

## 2017-01-25 RX ADMIN — DICLOFENAC SODIUM 1 DROP: 1 SOLUTION/ DROPS OPHTHALMIC at 06:58

## 2017-01-25 RX ADMIN — PHENYLEPHRINE HYDROCHLORIDE 1 DROP: 25 SOLUTION/ DROPS OPHTHALMIC at 06:58

## 2017-01-25 RX ADMIN — CYCLOPENTOLATE HYDROCHLORIDE 1 DROP: 20 SOLUTION/ DROPS OPHTHALMIC at 07:09

## 2017-01-25 RX ADMIN — PREDNISOLONE ACETATE 1 DROP: 10 SUSPENSION/ DROPS OPHTHALMIC at 08:06

## 2017-01-25 RX ADMIN — MIDAZOLAM HYDROCHLORIDE 0.5 MG: 1 INJECTION, SOLUTION INTRAMUSCULAR; INTRAVENOUS at 08:10

## 2017-01-25 NOTE — DISCHARGE INSTRUCTIONS
Nu Joe D.O., P.CMitchel  [facility] [address].  [phone]    Post-Operative Instructions for Cataract Surgery     Remove your eye shield and bandage at 12 noon the same day as surgery and start your eye drops. THROW AWAY THE GAUZE UNDER THE SHIELD.  Place the blue eye shield back on for one week while asleep, even if napping in the recliner. Use the tape included in your bag.  DO NOT RUB YOUR EYE EVER! DO NOT WIPE YOUR EYE! ONLY WIPE ON YOUR CHEEK!                DO NOT WEAR EYE MAKEUP FOR 1 WEEK!  You may take a bath today and you can shower starting tomorrow.  You may resume your previous diet.  If you use glaucoma drops in the operative eye, continue them as directed.  Common symptoms after surgery include a scratchy feeling, slight headache, red eye, and/or blurred vision. You may use Advil or Tylenol for any discomfort.  Avoid strenuous activities and driving until you see Dr. Ben Loyd tomorrow. Please use care when walking, your depth perception may be altered.  Bring your bag with your drops to your follow up appointment. Below are Instructions On How To Use Your Eye Drops. ON THE DAY OF SURGERY:     Use all three eye drops at 12 noon, 4pm, and 8pm.  Wait 10 minutes between drops. THE DAY AFTER SURGERY:     You will use the drops 4 times a day at 8am, 12 noon, 4pm, and 8pm.   Use the drops every day until bottles are empty. Vigamox (tan top) Put 1 drop in at 8am, 12 noon, 4pm, and 8pm.    Pred Acetate (pink top) Put 1 drop in at 8:10am, 12:10pm, 4:10pm, and 8:10pm. Shake before using. Diclofenac Put 1 drop in at 8:20am, 12:20pm, 4:20pm, 8:20pm.    CONTINUE DROPS UNTIL ALL BOTTLES ARE EMPTY! Follow-up appointment tomorrow at Dr. Akil Diego office:________09:00am____________    Please call the office at [phone] if you experience severe pain or nausea.        DISCHARGE SUMMARY from Nurse    The following personal items collected during your admission are returned to you:   Dental Appliance: Dental Appliances: Uppers, With patient, Partials  Vision: Visual Aid: None  Hearing Aid:    Jewelry: Jewelry: None  Clothing: Clothing: With patient  Other Valuables: Other Valuables: None  Valuables sent to safe:      PATIENT INSTRUCTIONS:    After general anesthesia or intravenous sedation, for 24 hours or while taking prescription arcotics:  · Someone should be with you for the next 24 hours. · For your own safety, a responsible adult must drive you home. · Limit your activities  · Recommended activity: Rest today and no driving until after your appointment with Dr. Isaiah Khanna tomorrow. · Do not drive and operate hazardous machinery  · Do not make important personal or business decisions  · Do  not drink alcoholic beverages  · If you have not urinated within 8 hours after discharge, please contact your surgeon on call. Report the following to your surgeon:  · Excessive pain, swelling, redness or odor of or around the surgical area  · Temperature over 100.5  · Any nausea and vomiting   ·   · You will receive a Post Operative Call from one of the Recovery Room Nurses on the day after your surgery to check on you. It is very important for us to know how you are recovering after your surgery. · You may receive an e-mail or letter in the mail from [facility] regarding your experience with us in the Ambulatory Surgery Unit. Your feedback is valuable to us and we appreciate your participation in the survey. ·   ·     · If the above instructions are not adequate, please contact Keyur Harmon RN, [facility] anesthesia Nurse Manager or our Anesthesiologist, at [phone]. ·   · We wish you a speedy recovery ? What to do at Home:    *  Please give a list of your current medications to your Primary Care Provider.     *  Please update this list whenever your medications are discontinued, doses are      changed, or new medications (including over-the-counter products) are added. *  Please carry medication information at all times in case of emergency situations. These are general instructions for a healthy lifestyle:    No smoking/ No tobacco products/ Avoid exposure to second hand smoke    Surgeon General's Warning:  Quitting smoking now greatly reduces serious risk to your health. Obesity, smoking, and sedentary lifestyle greatly increases your risk for illness    A healthy diet, regular physical exercise & weight monitoring are important for maintaining a healthy lifestyle    You may be retaining fluid if you have a history of heart failure or if you experience any of the following symptoms:  Weight gain of 3 pounds or more overnight or 5 pounds in a week, increased swelling in our hands or feet or shortness of breath while lying flat in bed. Please call your doctor as soon as you notice any of these symptoms; do not wait until your next office visit. Recognize signs and symptoms of STROKE:    F-face looks uneven    A-arms unable to move or move even    S-speech slurred or non-existent    T-time-call 911 as soon as signs and symptoms begin-DO NOT go       Back to bed or wait to see if you get better-TIME IS BRAIN. If you have not had your influenza or pneumococcal vaccines, please follow up with your primary care physician. The discharge information has been reviewed with the patient and caregiver. The patient and caregiver verbalized understanding.

## 2017-01-25 NOTE — ANESTHESIA POSTPROCEDURE EVALUATION
Post-Anesthesia Evaluation and Assessment    Patient: Nanda Moura MRN: [id number]  SSN: [id number]    YOB: 1945  Age: 70 y.o. Sex: female       Cardiovascular Function/Vital Signs  Visit Vitals    /79    Pulse 64    Temp 36.7 °C (98 °F)    Resp 17    Ht 5' 2\" (1.575 m)    Wt 81.2 kg (179 lb)    SpO2 97%    BMI 32.74 kg/m2       Patient is status post MAC anesthesia for Procedure(s):  CATARACT EXTRACTION WITH INTRA OCULAR LENS IMPLANT LEFT EYE. Nausea/Vomiting: None    Postoperative hydration reviewed and adequate. Pain:  Pain Scale 1: Numeric (0 - 10) (01/25/17 0819)  Pain Intensity 1: 0 (01/25/17 0639)   Managed    Neurological Status:   Neuro (WDL): Exceptions to WDL (01/25/17 0819)  Neuro  Neurologic State: Alert (01/25/17 0819)  LUE Motor Response: Purposeful (01/25/17 0819)  LLE Motor Response: Purposeful (01/25/17 0819)  RUE Motor Response: Purposeful (01/25/17 0819)  RLE Motor Response: Purposeful (01/25/17 0819)   At baseline    Mental Status and Level of Consciousness: Arousable    Pulmonary Status:   O2 Device: Room air (01/25/17 0819)   Adequate oxygenation and airway patent    Complications related to anesthesia: None    Post-anesthesia assessment completed.  No concerns    Signed By: Lenore Andrade MD     January 25, 2017

## 2017-01-25 NOTE — OP NOTES
Name: Saleem Velarde MASC-4        updated 3/1/2013  Description:  Sandy Banks with intraocular lens implant    PREOPERATIVE DIAGNOSIS: Visually impairing combined cataract, Left eye. POSTOPERATIVE DIAGNOSIS: Visually impairing combined   cataract,Left eye. OPERATION: Procedure(s):  CATARACT EXTRACTION WITH INTRA OCULAR LENS IMPLANT LEFT EYE    ANESTHESIA: Topical with intravenous sedation    TYPE OF LENS IMPLANT USED:   Implant Name Type Inv. Item Serial No.  Lot No. LRB No. Used Action   LENS ASPHERIC KAREN SOF IQ 19.5D -- [employer] - [id number]   LENS ASPHERIC KAREN SOF IQ 19.5D -- Shelia Hu [id number] [id number]   Left 1 Implanted       PHACO TIME:   48 seconds at an average power of 18%. Estimated blood loss: None    Complications:None    Specimen removed: None    DESCRIPTION OF PROCEDURE:  The patient was brought to the holding area, where an IV was begun at the keep open rate. The patient received several instillations of Antelmo-Synephrine 2.5%, Cyclogyl 2%, and tetracaine 0.5% until adequate pupillary dilatation was achieved. The patient was connected to cardiovascular monitoring. Prior to being brought back to the operating suite, the patient received 2 drops of Betadine 5% solution into the inferior cul-de-sac of the operated eye. The patient was then brought back to the operating suite, and prepped and draped in the usual sterile manner after being re-connnected to cardiovascular monitoring. One drop of 4% Xylocaine was then instilled onto the eye. The lid speculum was set into position and the operating microscope was focused on the patient. Two drops of 4% Xylocaine were again instilled onto the eye. Using the fixation ring, the sharp 15-degree blade was used to create a paracentesis site and 1% MPF Xylocaine was instilled into the anterior chamber for anesthesia. Viscoelastic was then instilled into the anterior chamber.  The 2.4 mm keratome was then utilized to create a clear corneal incision, and a circular capsulorrhexis was performed. BSS was utilized to perform careful hydrodissection of the lens. Viscoelastic was then instilled into the anterior chamber to protect the corneal endothelium. Phacoemulsification was then carried out. Any remaining cortical material was removed in irrigation/aspiration mode. Viscoelastic was then instilled into the capsular bag. The lens implant was inspected for any defects. After finding none, the lens was placed in its injector and inserted into the capsular bag. The lens implant was centered on the patient's visual/astigmatic axis. The viscoelastic was then removed from the eye. Miochol was instilled posterior to the iris plane to facilitate pupillary miosis. The wound was checked for any leaks, after finding none, Iopidine and Pred Forte drops were instilled into the inferior cul-de-sac, and gentamicin ointment was placed over the cornea. A semi-pressure dressing and shield were then placed for the patient. The patient tolerated the procedure very well, and was brought to the recovery room in an alert and stable and satisfactory postoperative condition. Instructions were given to the patient and their family for their immediate postoperative care.   [address] [place] [address],   1/25/2017

## 2017-01-25 NOTE — PERIOP NOTES
Corin Smith  1945  [id number]    Situation:  Verbal report given from:  Nithya ONEAL   Procedure: Procedure(s):  CATARACT EXTRACTION WITH INTRA OCULAR LENS IMPLANT LEFT EYE    Background:    Preoperative diagnosis: Combined form of age-related cataract, left eye [H25.812]    Postoperative diagnosis: Combined form of age-related cataract, left eye [H25.812]    :  Dr. Brittany Coley    Assistant(s): Circ-1: Scarlet Coffman RN  Scrub Tech-1: Shalini Oleary    Specimens: * No specimens in log *    Assessment:  Intra-procedure medications         Anesthesia gave intra-procedure sedation and medications, see anesthesia flow sheet     Intravenous fluids: LR@ KVO     Vital signs stable       Recommendation:    Permission to share finding with family or friend yes

## 2017-01-25 NOTE — IP AVS SNAPSHOT
[address] [facility] 
[phone] Patient: Dell Quijano MRN: [id number] ASC:0/9/5412 You are allergic to the following Allergen Reactions Cefuroxime Other (comments) Tightness in abd Recent Documentation Height Weight BMI OB Status Smoking Status 1.575 m 81.2 kg 32.74 kg/m2 Hysterectomy Never Smoker Emergency Contacts Name Discharge Info Relation Home Work [employer] [address] CAREGIVER [3] Child [2] [phone] About your hospitalization You were admitted on:  January 25, 2017 You last received care in the:  [facility] You were discharged on:  January 25, 2017 Unit phone number:  [phone] Why you were hospitalized Your primary diagnosis was:  Combined Forms Of Age-Related Cataract Of Left Eye  
  
  
 
  
  
Providers Seen During Your Hospitalizations Provider Role Specialty Primary office phone Sapna Deras DO Attending Provider Ophthalmology [phone] Your Primary Care Physician (PCP) Primary Care Physician Office Phone Office Fax [employer] [place] [phone] Follow-up Information None Your Appointments Wednesday January 25, 2017 CATARACT EXTRACTION WITH INTRA OCULAR LENS IMPLANT with Sapna Deras DO  
[facility] AMB SURGERY UNIT (RI OR PRE ASSESSMENT) [address] [facility]  
[phone] Current Discharge Medication List  
  
ASK your doctor about these medications Dose & Instructions Dispensing Information Comments Morning Noon Evening Bedtime  
 aspirin 81 mg chewable tablet Your next dose is: Today, Tomorrow Other:  _________ Dose:  81 mg Take 81 mg by mouth daily. Refills:  0  
     
   
   
   
  
 diclofenac 0.1 % ophthalmic solution Commonly known as:  VOLTAREN  
   
 Your next dose is: Today, Tomorrow Other:  _________ Dose:  1 Drop Administer 1 Drop to right eye four (4) times daily. Refills:  0  
     
   
   
   
  
 lisinopril 40 mg tablet Commonly known as:  Merilynn Sarath Your next dose is: Today, Tomorrow Other:  _________ Dose:  40 mg Take 40 mg by mouth daily. Refills:  0 METAMUCIL PO Your next dose is: Today, Tomorrow Other:  _________ Take  by mouth. Refills:  0  
     
   
   
   
  
 prednisoLONE acetate 1 % ophthalmic suspension Commonly known as:  PRED FORTE Your next dose is: Today, Tomorrow Other:  _________ Dose:  1 Drop Administer 1 Drop to right eye four (4) times daily. Refills:  0  
     
   
   
   
  
 * VIGAMOX 0.5 % ophthalmic solution Generic drug:  moxifloxacin Your next dose is: Today, Tomorrow Other:  _________ Dose:  1 Drop Administer 1 Drop to right eye four (4) times daily. Refills:  0  
     
   
   
   
  
 * VIGAMOX 0.5 % ophthalmic solution Generic drug:  moxifloxacin Your next dose is: Today, Tomorrow Other:  _________ Dose:  1 Drop Administer 1 Drop to left eye four (4) times daily. Refills:  0 ZyrTEC 10 mg Cap Generic drug:  Cetirizine Your next dose is: Today, Tomorrow Other:  _________ Dose:  1 Tab Take 1 Tab by mouth nightly as needed. Refills:  0  
     
   
   
   
  
 * Notice: This list has 2 medication(s) that are the same as other medications prescribed for you. Read the directions carefully, and ask your doctor or other care provider to review them with you. Discharge Instructions Jaci Cerna D.O., P.CMitchel 
[facility] 183. [phone] Post-Operative Instructions for Cataract Surgery ? Remove your eye shield and bandage at 12 noon the same day as surgery and start your eye drops. THROW AWAY THE GAUZE UNDER THE SHIELD. ? Place the blue eye shield back on for one week while asleep, even if napping in the recliner. Use the tape included in your bag.   
       
 
? DO NOT RUB YOUR EYE EVER! DO NOT WIPE YOUR EYE! ONLY WIPE ON YOUR CHEEK! 
 
            DO NOT WEAR EYE MAKEUP FOR 1 WEEK! 
 
 
? You may take a bath today and you can shower starting tomorrow. ? You may resume your previous diet. ? If you use glaucoma drops in the operative eye, continue them as directed. ? Common symptoms after surgery include a scratchy feeling, slight headache, red eye, and/or blurred vision. You may use Advil or Tylenol for any discomfort. ? Avoid strenuous activities and driving until you see Dr. Kandy Hernandez tomorrow. Please use care when walking, your depth perception may be altered. ? Bring your bag with your drops to your follow up appointment. Below are Instructions On How To Use Your Eye Drops. ON THE DAY OF SURGERY: 
 
? Use all three eye drops at 12 noon, 4pm, and 8pm.  Wait 10 minutes between drops. THE DAY AFTER SURGERY: 
 
? You will use the drops 4 times a day at 8am, 12 noon, 4pm, and 8pm. 
? Use the drops every day until bottles are empty. Vigamox (tan top) Put 1 drop in at 8am, 12 noon, 4pm, and 8pm. 
 
Pred Acetate (pink top) Put 1 drop in at 8:10am, 12:10pm, 4:10pm, and 8:10pm. Shake before using. Diclofenac Put 1 drop in at 8:20am, 12:20pm, 4:20pm, 8:20pm. 
 
CONTINUE DROPS UNTIL ALL BOTTLES ARE EMPTY! Follow-up appointment tomorrow at Dr. Meme Roblero office:________09:00am____________ Please call the office at [phone] if you experience severe pain or nausea. DISCHARGE SUMMARY from Nurse The following personal items collected during your admission are returned to you: Dental Appliance: Dental Appliances: Uppers, With patient, Partials Vision: Visual Aid: None Hearing Aid:   
Jewelry: Jewelry: None Clothing: Clothing: With patient Other Valuables: Other Valuables: None Valuables sent to safe:   
 
PATIENT INSTRUCTIONS: 
 
After general anesthesia or intravenous sedation, for 24 hours or while taking prescription arcotics: · Someone should be with you for the next 24 hours. · For your own safety, a responsible adult must drive you home. · Limit your activities · Recommended activity: Rest today and no driving until after your appointment with Dr. Kenyon Olivarez tomorrow. · Do not drive and operate hazardous machinery · Do not make important personal or business decisions · Do  not drink alcoholic beverages · If you have not urinated within 8 hours after discharge, please contact your surgeon on call. Report the following to your surgeon: 
· Excessive pain, swelling, redness or odor of or around the surgical area · Temperature over 100.5 · Any nausea and vomiting ·  
· You will receive a Post Operative Call from one of the Recovery Room Nurses on the day after your surgery to check on you. It is very important for us to know how you are recovering after your surgery. · You may receive an e-mail or letter in the mail from [facility] regarding your experience with us in the Ambulatory Surgery Unit. Your feedback is valuable to us and we appreciate your participation in the survey. ·  
·  
 
· If the above instructions are not adequate, please contact Jhonatan King RN, [place] anesthesia Nurse Manager or our Anesthesiologist, at [phone]. ·  
· We wish you a speedy recovery ? What to do at Home: *  Please give a list of your current medications to your Primary Care Provider. *  Please update this list whenever your medications are discontinued, doses are 
    changed, or new medications (including over-the-counter products) are added. *  Please carry medication information at all times in case of emergency situations. These are general instructions for a healthy lifestyle: No smoking/ No tobacco products/ Avoid exposure to second hand smoke Surgeon General's Warning:  Quitting smoking now greatly reduces serious risk to your health. Obesity, smoking, and sedentary lifestyle greatly increases your risk for illness A healthy diet, regular physical exercise & weight monitoring are important for maintaining a healthy lifestyle You may be retaining fluid if you have a history of heart failure or if you experience any of the following symptoms:  Weight gain of 3 pounds or more overnight or 5 pounds in a week, increased swelling in our hands or feet or shortness of breath while lying flat in bed. Please call your doctor as soon as you notice any of these symptoms; do not wait until your next office visit. Recognize signs and symptoms of STROKE: 
 
F-face looks uneven A-arms unable to move or move even S-speech slurred or non-existent T-time-call 911 as soon as signs and symptoms begin-DO NOT go Back to bed or wait to see if you get better-TIME IS BRAIN. If you have not had your influenza or pneumococcal vaccines, please follow up with your primary care physician. The discharge information has been reviewed with the patient and caregiver. The patient and caregiver verbalized understanding. Discharge Orders None Introducing [facility]! [employer] introduces [facility] patient portal. Now you can access parts of your medical record, email your doctor's office, and request medication refills online. 1. In your internet browser, go to [email or web address] 2. Click on the First Time User? Click Here link in the Sign In box. You will see the New Member Sign Up page. 3. Enter your [facility] Access Code exactly as it appears below.  You will not need to use this code after youve completed the sign-up process. If you do not sign up before the expiration date, you must request a new code. · [employer] Access Code: [email or web address] Expires: 4/5/2017  1:07 PM 
 
4. Enter the last four digits of your Social Security Number (xxxx) and Date of Birth (mm/dd/yyyy) as indicated and click Submit. You will be taken to the next sign-up page. 5. Create a [employer] ID. This will be your [employer] login ID and cannot be changed, so think of one that is secure and easy to remember. 6. Create a [employer] password. You can change your password at any time. 7. Enter your Password Reset Question and Answer. This can be used at a later time if you forget your password. 8. Enter your e-mail address. You will receive e-mail notification when new information is available in [address] 9. Click Sign Up. You can now view and download portions of your medical record. 10. Click the Download Summary menu link to download a portable copy of your medical information. If you have questions, please visit the Frequently Asked Questions section of the [employer] website. Remember, [employer] is NOT to be used for urgent needs. For medical emergencies, dial 911. Now available from your iPhone and Android! General Information Please provide this summary of care documentation to your next provider. Patient Signature:  ____________________________________________________________ Date:  ____________________________________________________________  
  
Jennifer Chavez Provider Signature:  ____________________________________________________________ Date:  ____________________________________________________________

## 2019-04-27 ENCOUNTER — APPOINTMENT (OUTPATIENT)
Dept: GENERAL RADIOLOGY | Age: 74
End: 2019-04-27
Attending: EMERGENCY MEDICINE
Payer: MEDICARE

## 2019-04-27 ENCOUNTER — HOSPITAL ENCOUNTER (EMERGENCY)
Age: 74
Discharge: HOME | End: 2019-04-27
Attending: EMERGENCY MEDICINE
Payer: MEDICARE

## 2019-04-27 VITALS
OXYGEN SATURATION: 98 % | HEART RATE: 66 BPM | WEIGHT: 170.42 LBS | SYSTOLIC BLOOD PRESSURE: 139 MMHG | TEMPERATURE: 98.5 F | DIASTOLIC BLOOD PRESSURE: 82 MMHG | BODY MASS INDEX: 31.17 KG/M2 | RESPIRATION RATE: 18 BRPM

## 2019-04-27 DIAGNOSIS — M54.41 ACUTE RIGHT-SIDED LOW BACK PAIN WITH RIGHT-SIDED SCIATICA: Primary | ICD-10-CM

## 2019-04-27 PROCEDURE — 73502 X-RAY EXAM HIP UNI 2-3 VIEWS: CPT

## 2019-04-27 PROCEDURE — 99282 EMERGENCY DEPT VISIT SF MDM: CPT

## 2019-04-27 PROCEDURE — 72100 X-RAY EXAM L-S SPINE 2/3 VWS: CPT

## 2019-04-27 RX ORDER — METHYLPREDNISOLONE 4 MG/1
TABLET ORAL
Qty: 1 DOSE PACK | Refills: 0 | Status: SHIPPED | OUTPATIENT
Start: 2019-04-27 | End: 2020-01-27

## 2019-04-27 RX ORDER — OLOPATADINE HYDROCHLORIDE 2 MG/ML
1 SOLUTION/ DROPS OPHTHALMIC
COMMUNITY
Start: 2019-04-19

## 2019-04-27 RX ORDER — LEVOTHYROXINE SODIUM 75 UG/1
75 TABLET ORAL
COMMUNITY
Start: 2019-04-03

## 2019-04-27 RX ORDER — AMLODIPINE AND BENAZEPRIL HYDROCHLORIDE 5; 20 MG/1; MG/1
CAPSULE ORAL
COMMUNITY
Start: 2019-04-04 | End: 2020-01-27

## 2019-04-27 NOTE — DISCHARGE INSTRUCTIONS
Patient Education        Back Pain: Care Instructions  Your Care Instructions    Back pain has many possible causes. It is often related to problems with muscles and ligaments of the back. It may also be related to problems with the nerves, discs, or bones of the back. Moving, lifting, standing, sitting, or sleeping in an awkward way can strain the back. Sometimes you don't notice the injury until later. Arthritis is another common cause of back pain. Although it may hurt a lot, back pain usually improves on its own within several weeks. Most people recover in 12 weeks or less. Using good home treatment and being careful not to stress your back can help you feel better sooner. Follow-up care is a key part of your treatment and safety. Be sure to make and go to all appointments, and call your doctor if you are having problems. It's also a good idea to know your test results and keep a list of the medicines you take. How can you care for yourself at home? · Sit or lie in positions that are most comfortable and reduce your pain. Try one of these positions when you lie down:  ? Lie on your back with your knees bent and supported by large pillows. ? Lie on the floor with your legs on the seat of a sofa or chair. ? Lie on your side with your knees and hips bent and a pillow between your legs. ? Lie on your stomach if it does not make pain worse. · Do not sit up in bed, and avoid soft couches and twisted positions. Bed rest can help relieve pain at first, but it delays healing. Avoid bed rest after the first day of back pain. · Change positions every 30 minutes. If you must sit for long periods of time, take breaks from sitting. Get up and walk around, or lie in a comfortable position. · Try using a heating pad on a low or medium setting for 15 to 20 minutes every 2 or 3 hours. Try a warm shower in place of one session with the heating pad. · You can also try an ice pack for 10 to 15 minutes every 2 to 3 hours. Put a thin cloth between the ice pack and your skin. · Take pain medicines exactly as directed. ? If the doctor gave you a prescription medicine for pain, take it as prescribed. ? If you are not taking a prescription pain medicine, ask your doctor if you can take an over-the-counter medicine. · Take short walks several times a day. You can start with 5 to 10 minutes, 3 or 4 times a day, and work up to longer walks. Walk on level surfaces and avoid hills and stairs until your back is better. · Return to work and other activities as soon as you can. Continued rest without activity is usually not good for your back. · To prevent future back pain, do exercises to stretch and strengthen your back and stomach. Learn how to use good posture, safe lifting techniques, and proper body mechanics. When should you call for help? Call your doctor now or seek immediate medical care if:    · You have new or worsening numbness in your legs.     · You have new or worsening weakness in your legs. (This could make it hard to stand up.)     · You lose control of your bladder or bowels.    Watch closely for changes in your health, and be sure to contact your doctor if:    · You have a fever, lose weight, or don't feel well.     · You do not get better as expected. Where can you learn more? Go to http://dat-mattie.info/. Enter T563 in the search box to learn more about \"Back Pain: Care Instructions. \"  Current as of: September 20, 2018  Content Version: 11.9  © 5521-5040 [employer]. Care instructions adapted under license by [employer] (which disclaims liability or warranty for this information). If you have questions about a medical condition or this instruction, always ask your healthcare professional. [place] [address] any warranty or liability for your use of this information.

## 2019-04-27 NOTE — ED PROVIDER NOTES
Pt. Presents to the ER with complaints of low back/back pain. Pt. [place] that her pain started because she moved a heavy picnic table 11 days ago. Pt. Says that she did not have any pain until one week ago. She bent down to help a child  an [place] egg when she suddenly had severe pain in her right back/hip. Pt's pain has been persistent since. She took Tylenol without relief. No fevers/chills. No falls. Pt. Denies similar sx in the past.  No changes with her urine or bowel movements. No abdominal pain. Pt. Says that she can walk without difficulty, but she has pain with lifting her leg up and with different movements. Past Medical History:  
Diagnosis Date  Hypertension Past Surgical History:  
Procedure Laterality Date  HX APPENDECTOMY  HX CARPAL TUNNEL RELEASE Bilateral   
 HX CATARACT REMOVAL Right 01/18/2017  HX COLONOSCOPY    
 HX GYN    
 hysterectomy  HX GYN    
 vaginal delivery x 1 Family History:  
Problem Relation Age of Onset  Cancer Maternal Grandfather  Colon Cancer Maternal Grandfather Social History Socioeconomic History  Marital status:  Spouse name: Not on file  Number of children: Not on file  Years of education: Not on file  Highest education level: Not on file Occupational History  Not on file Social Needs  Financial resource strain: Not on file  Food insecurity:  
  Worry: Not on file Inability: Not on file  Transportation needs:  
  Medical: Not on file Non-medical: Not on file Tobacco Use  Smoking status: Never Smoker  Smokeless tobacco: Never Used Substance and Sexual Activity  Alcohol use: No  
 Drug use: No  
 Sexual activity: Not on file Lifestyle  Physical activity:  
  Days per week: Not on file Minutes per session: Not on file  Stress: Not on file Relationships  Social connections:  
  Talks on phone: Not on file Gets together: Not on file Attends Worship service: Not on file Active member of club or organization: Not on file Attends meetings of clubs or organizations: Not on file Relationship status: Not on file  Intimate partner violence:  
  Fear of current or ex partner: Not on file Emotionally abused: Not on file Physically abused: Not on file Forced sexual activity: Not on file Other Topics Concern  Not on file Social History Narrative  Not on file ALLERGIES: Cefuroxime Review of Systems Constitutional: Negative for chills and fever. HENT: Negative for rhinorrhea and sore throat. Respiratory: Negative for cough and shortness of breath. Cardiovascular: Negative for chest pain. Gastrointestinal: Negative for abdominal pain, diarrhea, nausea and vomiting. Genitourinary: Negative for dysuria and urgency. Musculoskeletal: Positive for back pain. Skin: Negative for rash. Neurological: Negative for dizziness, weakness and light-headedness. Vitals:  
 04/27/19 0810 BP: 139/82 Pulse: 66 Resp: 18 Temp: 98.5 °F (36.9 °C) SpO2: 98% Weight: 77.3 kg (170 lb 6.7 oz) Physical Exam  
 
 
Vital signs reviewed. Nursing notes reviewed. Const:  No acute distress, well developed, well nourished Head:  Atraumatic, normocephalic Eyes:  PERRL, conjunctiva normal, no scleral icterus Neck:  Supple, trachea midline Cardiovascular:  RRR, no murmurs, no gallops, no rubs Resp:  No resp distress, no increased work of breathing, no wheezes, no rhonchi, no rales, Abd:  Soft, non-tender, non-distended, no rebound, no guarding, no CVA tenderness :  Deferred MSK: no midline C, T, or L spine tenderness, focal tenderness over the right SI joint, positive straight leg on the right, negative straight leg raise on the left Neuro:  Alert and oriented x3, no cranial nerve defect Skin:  Warm, dry, intact Psych: normal mood and affect, behavior is normal, judgement and thought content is normal 
 
 
 
 
MDM Number of Diagnoses or Management Options Acute right-sided low back pain with right-sided sciatica:  
  
Amount and/or Complexity of Data Reviewed Tests in the radiology section of CPT®: ordered and reviewed Review and summarize past medical records: yes Patient Progress Patient progress: stable Pt. Presents to the ER with low back pain, consistent with sciatica. Pt. Is well appearing in the ER. Xray shows L4 endplate deformity. Pt. Has no focal tenderness there. It appears chronic on the xray. I will start her on a medrol dosepak for possible sciatica. Pt. Given strict instructions to f/u with her PCP or return to the ER with worsening sx. Procedures

## 2019-04-27 NOTE — ED TRIAGE NOTES
Triage note: Pt states the Tues before last she was moving a picnic table and pulled on it while it was stuck in the ground, and then this past Sunday she began to experience lower right back pain that radiates across her lower back. Has taken Tylenol with mild relief. States last night it became worse and she had difficulty sleeping.

## 2020-01-27 ENCOUNTER — HOSPITAL ENCOUNTER (OUTPATIENT)
Dept: PREADMISSION TESTING | Age: 75
Discharge: HOME | End: 2020-01-27
Payer: MEDICARE

## 2020-01-27 VITALS
DIASTOLIC BLOOD PRESSURE: 81 MMHG | RESPIRATION RATE: 14 BRPM | WEIGHT: 165.13 LBS | BODY MASS INDEX: 29.26 KG/M2 | HEART RATE: 58 BPM | HEIGHT: 63 IN | SYSTOLIC BLOOD PRESSURE: 179 MMHG | TEMPERATURE: 98 F

## 2020-01-27 LAB
ABO + RH BLD: NORMAL
ANION GAP SERPL CALC-SCNC: 3 MMOL/L (ref 5–15)
APPEARANCE UR: CLEAR
ATRIAL RATE: 52 BPM
BACTERIA URNS QL MICRO: NEGATIVE /HPF
BILIRUB UR QL: NEGATIVE
BLOOD GROUP ANTIBODIES SERPL: NORMAL
BUN SERPL-MCNC: 18 MG/DL (ref 6–20)
BUN/CREAT SERPL: 19 (ref 12–20)
CALCIUM SERPL-MCNC: 8.9 MG/DL (ref 8.5–10.1)
CALCULATED P AXIS, ECG09: 47 DEGREES
CALCULATED R AXIS, ECG10: 49 DEGREES
CALCULATED T AXIS, ECG11: 58 DEGREES
CHLORIDE SERPL-SCNC: 106 MMOL/L (ref 97–108)
CO2 SERPL-SCNC: 30 MMOL/L (ref 21–32)
COLOR UR: NORMAL
CREAT SERPL-MCNC: 0.95 MG/DL (ref 0.55–1.02)
DIAGNOSIS, 93000: NORMAL
EPITH CASTS URNS QL MICRO: NORMAL /LPF
ERYTHROCYTE [DISTWIDTH] IN BLOOD BY AUTOMATED COUNT: 12.7 % (ref 11.5–14.5)
EST. AVERAGE GLUCOSE BLD GHB EST-MCNC: 117 MG/DL
GLUCOSE SERPL-MCNC: 100 MG/DL (ref 65–100)
GLUCOSE UR STRIP.AUTO-MCNC: NEGATIVE MG/DL
HBA1C MFR BLD: 5.7 % (ref 4–5.6)
HCT VFR BLD AUTO: 44.6 % (ref 35–47)
HGB BLD-MCNC: 14.5 G/DL (ref 11.5–16)
HGB UR QL STRIP: NEGATIVE
HYALINE CASTS URNS QL MICRO: NORMAL /LPF (ref 0–5)
INR PPP: 1.1 (ref 0.9–1.1)
KETONES UR QL STRIP.AUTO: NEGATIVE MG/DL
LEUKOCYTE ESTERASE UR QL STRIP.AUTO: NEGATIVE
MCH RBC QN AUTO: 30.1 PG (ref 26–34)
MCHC RBC AUTO-ENTMCNC: 32.5 G/DL (ref 30–36.5)
MCV RBC AUTO: 92.5 FL (ref 80–99)
NITRITE UR QL STRIP.AUTO: NEGATIVE
NRBC # BLD: 0 K/UL (ref 0–0.01)
NRBC BLD-RTO: 0 PER 100 WBC
P-R INTERVAL, ECG05: 146 MS
PH UR STRIP: 5.5 [PH] (ref 5–8)
PLATELET # BLD AUTO: 216 K/UL (ref 150–400)
PMV BLD AUTO: 12.2 FL (ref 8.9–12.9)
POTASSIUM SERPL-SCNC: 4.1 MMOL/L (ref 3.5–5.1)
PROT UR STRIP-MCNC: NEGATIVE MG/DL
PROTHROMBIN TIME: 10.7 SEC (ref 9–11.1)
Q-T INTERVAL, ECG07: 430 MS
QRS DURATION, ECG06: 78 MS
QTC CALCULATION (BEZET), ECG08: 399 MS
RBC # BLD AUTO: 4.82 M/UL (ref 3.8–5.2)
RBC #/AREA URNS HPF: NORMAL /HPF (ref 0–5)
SODIUM SERPL-SCNC: 139 MMOL/L (ref 136–145)
SP GR UR REFRACTOMETRY: 1.01 (ref 1–1.03)
SPECIMEN EXP DATE BLD: NORMAL
UA: UC IF INDICATED,UAUC: NORMAL
UROBILINOGEN UR QL STRIP.AUTO: 0.2 EU/DL (ref 0.2–1)
VENTRICULAR RATE, ECG03: 52 BPM
WBC # BLD AUTO: 5.2 K/UL (ref 3.6–11)
WBC URNS QL MICRO: NORMAL /HPF (ref 0–4)

## 2020-01-27 PROCEDURE — 93005 ELECTROCARDIOGRAM TRACING: CPT

## 2020-01-27 PROCEDURE — 83036 HEMOGLOBIN GLYCOSYLATED A1C: CPT

## 2020-01-27 PROCEDURE — 86900 BLOOD TYPING SEROLOGIC ABO: CPT

## 2020-01-27 PROCEDURE — 81001 URINALYSIS AUTO W/SCOPE: CPT

## 2020-01-27 PROCEDURE — 80048 BASIC METABOLIC PNL TOTAL CA: CPT

## 2020-01-27 PROCEDURE — 85027 COMPLETE CBC AUTOMATED: CPT

## 2020-01-27 PROCEDURE — 85610 PROTHROMBIN TIME: CPT

## 2020-01-27 RX ORDER — ASCORBIC ACID 500 MG
500 TABLET ORAL DAILY
COMMUNITY

## 2020-01-27 RX ORDER — METOPROLOL SUCCINATE 50 MG/1
100 TABLET, EXTENDED RELEASE ORAL DAILY
COMMUNITY

## 2020-01-27 RX ORDER — KETOTIFEN FUMARATE 0.35 MG/ML
1 SOLUTION/ DROPS OPHTHALMIC 2 TIMES DAILY
COMMUNITY

## 2020-01-27 RX ORDER — BENAZEPRIL HYDROCHLORIDE 20 MG/1
40 TABLET ORAL DAILY
COMMUNITY

## 2020-01-27 NOTE — PERIOP NOTES
Preoperative instructions reviewed with patient. Patient given two bottles of CHG soap. Instructions to be reviewed in class on use of CHG soap. Patient given SSI infection FAQS sheet, as well as a MRSA/MSSA treatment instruction sheet with an explanation to patient that they will be notified if treatment instructions need to be initiated. Patient was given the opportunity to ask questions on the information provided.

## 2020-01-28 LAB
BACTERIA SPEC CULT: NORMAL
BACTERIA SPEC CULT: NORMAL
SERVICE CMNT-IMP: NORMAL

## 2020-02-09 ENCOUNTER — ANESTHESIA EVENT (OUTPATIENT)
Dept: SURGERY | Age: 75
End: 2020-02-09
Payer: MEDICARE

## 2020-02-10 ENCOUNTER — ANESTHESIA (OUTPATIENT)
Dept: SURGERY | Age: 75
End: 2020-02-10
Payer: MEDICARE

## 2020-02-10 ENCOUNTER — HOSPITAL ENCOUNTER (OUTPATIENT)
Age: 75
Setting detail: OBSERVATION
Discharge: HOME HEALTH | End: 2020-02-11
Attending: ORTHOPAEDIC SURGERY | Admitting: ORTHOPAEDIC SURGERY
Payer: MEDICARE

## 2020-02-10 DIAGNOSIS — M17.5 OTHER SECONDARY OSTEOARTHRITIS OF LEFT KNEE: Primary | ICD-10-CM

## 2020-02-10 PROBLEM — M17.12 LEFT KNEE DJD: Status: ACTIVE | Noted: 2020-02-10

## 2020-02-10 LAB
GLUCOSE BLD STRIP.AUTO-MCNC: 105 MG/DL (ref 65–100)
GLUCOSE BLD STRIP.AUTO-MCNC: 86 MG/DL (ref 65–100)
SERVICE CMNT-IMP: ABNORMAL
SERVICE CMNT-IMP: NORMAL

## 2020-02-10 PROCEDURE — 97116 GAIT TRAINING THERAPY: CPT

## 2020-02-10 PROCEDURE — C1776 JOINT DEVICE (IMPLANTABLE): HCPCS | Performed by: ORTHOPAEDIC SURGERY

## 2020-02-10 PROCEDURE — 74011250636 HC RX REV CODE- 250/636: Performed by: ORTHOPAEDIC SURGERY

## 2020-02-10 PROCEDURE — 77030018836 HC SOL IRR NACL ICUM -A: Performed by: ORTHOPAEDIC SURGERY

## 2020-02-10 PROCEDURE — 74011250636 HC RX REV CODE- 250/636: Performed by: PHYSICIAN ASSISTANT

## 2020-02-10 PROCEDURE — 77030018729 HC ELECTRD DEFIB PAD CARD -B

## 2020-02-10 PROCEDURE — 77030018846 HC SOL IRR STRL H20 ICUM -A: Performed by: ORTHOPAEDIC SURGERY

## 2020-02-10 PROCEDURE — 74011000258 HC RX REV CODE- 258: Performed by: NURSE ANESTHETIST, CERTIFIED REGISTERED

## 2020-02-10 PROCEDURE — 74011250637 HC RX REV CODE- 250/637: Performed by: NURSE PRACTITIONER

## 2020-02-10 PROCEDURE — 74011250636 HC RX REV CODE- 250/636: Performed by: NURSE ANESTHETIST, CERTIFIED REGISTERED

## 2020-02-10 PROCEDURE — 77030003601 HC NDL NRV BLK BBMI -A

## 2020-02-10 PROCEDURE — 77030011640 HC PAD GRND REM COVD -A: Performed by: ORTHOPAEDIC SURGERY

## 2020-02-10 PROCEDURE — 74011250637 HC RX REV CODE- 250/637: Performed by: PHYSICIAN ASSISTANT

## 2020-02-10 PROCEDURE — C1713 ANCHOR/SCREW BN/BN,TIS/BN: HCPCS | Performed by: ORTHOPAEDIC SURGERY

## 2020-02-10 PROCEDURE — 77030000032 HC CUF TRNQT ZIMM -B: Performed by: ORTHOPAEDIC SURGERY

## 2020-02-10 PROCEDURE — 74011250637 HC RX REV CODE- 250/637: Performed by: ORTHOPAEDIC SURGERY

## 2020-02-10 PROCEDURE — C9290 INJ, BUPIVACAINE LIPOSOME: HCPCS | Performed by: ORTHOPAEDIC SURGERY

## 2020-02-10 PROCEDURE — 77030002933 HC SUT MCRYL J&J -A: Performed by: ORTHOPAEDIC SURGERY

## 2020-02-10 PROCEDURE — 74011000258 HC RX REV CODE- 258: Performed by: ORTHOPAEDIC SURGERY

## 2020-02-10 PROCEDURE — 74011000250 HC RX REV CODE- 250: Performed by: NURSE ANESTHETIST, CERTIFIED REGISTERED

## 2020-02-10 PROCEDURE — 77030040922 HC BLNKT HYPOTHRM STRY -A

## 2020-02-10 PROCEDURE — 77030012935 HC DRSG AQUACEL BMS -B: Performed by: ORTHOPAEDIC SURGERY

## 2020-02-10 PROCEDURE — 77030005513 HC CATH URETH FOL11 MDII -B: Performed by: ORTHOPAEDIC SURGERY

## 2020-02-10 PROCEDURE — 74011250636 HC RX REV CODE- 250/636: Performed by: ANESTHESIOLOGY

## 2020-02-10 PROCEDURE — 74011000250 HC RX REV CODE- 250: Performed by: ANESTHESIOLOGY

## 2020-02-10 PROCEDURE — 97161 PT EVAL LOW COMPLEX 20 MIN: CPT

## 2020-02-10 PROCEDURE — 76010000162 HC OR TIME 1.5 TO 2 HR INTENSV-TIER 1: Performed by: ORTHOPAEDIC SURGERY

## 2020-02-10 PROCEDURE — 74011000250 HC RX REV CODE- 250: Performed by: ORTHOPAEDIC SURGERY

## 2020-02-10 PROCEDURE — 77030040361 HC SLV COMPR DVT MDII -B

## 2020-02-10 PROCEDURE — 99218 HC RM OBSERVATION: CPT

## 2020-02-10 PROCEDURE — 82962 GLUCOSE BLOOD TEST: CPT

## 2020-02-10 PROCEDURE — 77030018673: Performed by: ORTHOPAEDIC SURGERY

## 2020-02-10 PROCEDURE — 76210000016 HC OR PH I REC 1 TO 1.5 HR: Performed by: ORTHOPAEDIC SURGERY

## 2020-02-10 PROCEDURE — 77030007866 HC KT SPN ANES BBMI -B: Performed by: ANESTHESIOLOGY

## 2020-02-10 PROCEDURE — 97110 THERAPEUTIC EXERCISES: CPT

## 2020-02-10 PROCEDURE — 77030006822 HC BLD SAW SAG BRSM -B: Performed by: ORTHOPAEDIC SURGERY

## 2020-02-10 PROCEDURE — 77030010783 HC BOWL MX BN CEM J&J -B: Performed by: ORTHOPAEDIC SURGERY

## 2020-02-10 PROCEDURE — 77030008462 HC STPLR SKN PROX J&J -A: Performed by: ORTHOPAEDIC SURGERY

## 2020-02-10 PROCEDURE — 77030035236 HC SUT PDS STRATFX BARB J&J -B: Performed by: ORTHOPAEDIC SURGERY

## 2020-02-10 PROCEDURE — 76060000035 HC ANESTHESIA 2 TO 2.5 HR: Performed by: ORTHOPAEDIC SURGERY

## 2020-02-10 PROCEDURE — 77030031139 HC SUT VCRL2 J&J -A: Performed by: ORTHOPAEDIC SURGERY

## 2020-02-10 PROCEDURE — 77030039825 HC MSK NSL PAP SUPERNO2VA VYRM -B: Performed by: ANESTHESIOLOGY

## 2020-02-10 DEVICE — KIT TKR CEM VIT E SURF AND INSTRMT: Type: IMPLANTABLE DEVICE | Site: KNEE | Status: FUNCTIONAL

## 2020-02-10 DEVICE — SMARTSET GHV GENTAMICIN HIGH VISCOSITY BONE CEMENT 40G
Type: IMPLANTABLE DEVICE | Site: KNEE | Status: FUNCTIONAL
Brand: SMARTSET

## 2020-02-10 DEVICE — IMPLANTABLE DEVICE
Type: IMPLANTABLE DEVICE | Site: KNEE | Status: FUNCTIONAL
Brand: PERSONA™

## 2020-02-10 DEVICE — IMPLANTABLE DEVICE: Type: IMPLANTABLE DEVICE | Site: KNEE | Status: FUNCTIONAL

## 2020-02-10 DEVICE — IMPLANTABLE DEVICE
Type: IMPLANTABLE DEVICE | Site: KNEE | Status: FUNCTIONAL
Brand: PERSONA® VIVACIT-E®

## 2020-02-10 DEVICE — IMPLANTABLE DEVICE
Type: IMPLANTABLE DEVICE | Site: KNEE | Status: FUNCTIONAL
Brand: PERSONA®

## 2020-02-10 RX ORDER — PROPOFOL 10 MG/ML
INJECTION, EMULSION INTRAVENOUS
Status: DISCONTINUED | OUTPATIENT
Start: 2020-02-10 | End: 2020-02-10 | Stop reason: HOSPADM

## 2020-02-10 RX ORDER — FACIAL-BODY WIPES
10 EACH TOPICAL DAILY PRN
Status: DISCONTINUED | OUTPATIENT
Start: 2020-02-12 | End: 2020-02-11 | Stop reason: HOSPADM

## 2020-02-10 RX ORDER — LEVOTHYROXINE SODIUM 75 UG/1
75 TABLET ORAL
Status: DISCONTINUED | OUTPATIENT
Start: 2020-02-11 | End: 2020-02-11 | Stop reason: HOSPADM

## 2020-02-10 RX ORDER — AMOXICILLIN 250 MG
1 CAPSULE ORAL 2 TIMES DAILY
Status: DISCONTINUED | OUTPATIENT
Start: 2020-02-10 | End: 2020-02-11 | Stop reason: HOSPADM

## 2020-02-10 RX ORDER — ROPIVACAINE HYDROCHLORIDE 5 MG/ML
30 INJECTION, SOLUTION EPIDURAL; INFILTRATION; PERINEURAL AS NEEDED
Status: DISCONTINUED | OUTPATIENT
Start: 2020-02-10 | End: 2020-02-10 | Stop reason: HOSPADM

## 2020-02-10 RX ORDER — CELECOXIB 200 MG/1
200 CAPSULE ORAL ONCE
Status: COMPLETED | OUTPATIENT
Start: 2020-02-10 | End: 2020-02-10

## 2020-02-10 RX ORDER — ONDANSETRON 2 MG/ML
INJECTION INTRAMUSCULAR; INTRAVENOUS AS NEEDED
Status: DISCONTINUED | OUTPATIENT
Start: 2020-02-10 | End: 2020-02-10 | Stop reason: HOSPADM

## 2020-02-10 RX ORDER — LISINOPRIL 10 MG/1
10 TABLET ORAL ONCE
Status: COMPLETED | OUTPATIENT
Start: 2020-02-10 | End: 2020-02-10

## 2020-02-10 RX ORDER — FENTANYL CITRATE 50 UG/ML
50 INJECTION, SOLUTION INTRAMUSCULAR; INTRAVENOUS AS NEEDED
Status: DISCONTINUED | OUTPATIENT
Start: 2020-02-10 | End: 2020-02-10 | Stop reason: HOSPADM

## 2020-02-10 RX ORDER — ASPIRIN 325 MG
325 TABLET, DELAYED RELEASE (ENTERIC COATED) ORAL 2 TIMES DAILY
Status: DISCONTINUED | OUTPATIENT
Start: 2020-02-10 | End: 2020-02-11 | Stop reason: HOSPADM

## 2020-02-10 RX ORDER — LISINOPRIL 20 MG/1
20 TABLET ORAL DAILY
Status: DISCONTINUED | OUTPATIENT
Start: 2020-02-11 | End: 2020-02-11 | Stop reason: HOSPADM

## 2020-02-10 RX ORDER — KETOTIFEN FUMARATE 0.35 MG/ML
1 SOLUTION/ DROPS OPHTHALMIC 2 TIMES DAILY
Status: DISCONTINUED | OUTPATIENT
Start: 2020-02-10 | End: 2020-02-10 | Stop reason: SDUPTHER

## 2020-02-10 RX ORDER — HYDRALAZINE HYDROCHLORIDE 20 MG/ML
10 INJECTION INTRAMUSCULAR; INTRAVENOUS
Status: DISCONTINUED | OUTPATIENT
Start: 2020-02-10 | End: 2020-02-11 | Stop reason: HOSPADM

## 2020-02-10 RX ORDER — POLYETHYLENE GLYCOL 3350 17 G/17G
17 POWDER, FOR SOLUTION ORAL DAILY
Status: DISCONTINUED | OUTPATIENT
Start: 2020-02-11 | End: 2020-02-10

## 2020-02-10 RX ORDER — METOPROLOL SUCCINATE 50 MG/1
50 TABLET, EXTENDED RELEASE ORAL DAILY
Status: DISCONTINUED | OUTPATIENT
Start: 2020-02-11 | End: 2020-02-11 | Stop reason: HOSPADM

## 2020-02-10 RX ORDER — SODIUM CHLORIDE 0.9 % (FLUSH) 0.9 %
5-40 SYRINGE (ML) INJECTION EVERY 8 HOURS
Status: DISCONTINUED | OUTPATIENT
Start: 2020-02-10 | End: 2020-02-10 | Stop reason: HOSPADM

## 2020-02-10 RX ORDER — METOPROLOL TARTRATE 25 MG/1
25 TABLET, FILM COATED ORAL ONCE
Status: DISCONTINUED | OUTPATIENT
Start: 2020-02-10 | End: 2020-02-10

## 2020-02-10 RX ORDER — OXYCODONE HYDROCHLORIDE 5 MG/1
5 TABLET ORAL
Status: DISCONTINUED | OUTPATIENT
Start: 2020-02-10 | End: 2020-02-11 | Stop reason: HOSPADM

## 2020-02-10 RX ORDER — ONDANSETRON 2 MG/ML
4 INJECTION INTRAMUSCULAR; INTRAVENOUS
Status: DISCONTINUED | OUTPATIENT
Start: 2020-02-10 | End: 2020-02-11 | Stop reason: HOSPADM

## 2020-02-10 RX ORDER — SODIUM CHLORIDE 0.9 % (FLUSH) 0.9 %
5-40 SYRINGE (ML) INJECTION AS NEEDED
Status: DISCONTINUED | OUTPATIENT
Start: 2020-02-10 | End: 2020-02-11 | Stop reason: HOSPADM

## 2020-02-10 RX ORDER — MORPHINE SULFATE 10 MG/ML
2 INJECTION, SOLUTION INTRAMUSCULAR; INTRAVENOUS
Status: DISCONTINUED | OUTPATIENT
Start: 2020-02-10 | End: 2020-02-10 | Stop reason: HOSPADM

## 2020-02-10 RX ORDER — SODIUM CHLORIDE 0.9 % (FLUSH) 0.9 %
5-40 SYRINGE (ML) INJECTION EVERY 8 HOURS
Status: DISCONTINUED | OUTPATIENT
Start: 2020-02-10 | End: 2020-02-11 | Stop reason: HOSPADM

## 2020-02-10 RX ORDER — ACETAMINOPHEN 500 MG
1000 TABLET ORAL EVERY 6 HOURS
Status: DISCONTINUED | OUTPATIENT
Start: 2020-02-10 | End: 2020-02-11 | Stop reason: HOSPADM

## 2020-02-10 RX ORDER — SODIUM CHLORIDE 0.9 % (FLUSH) 0.9 %
5-40 SYRINGE (ML) INJECTION AS NEEDED
Status: DISCONTINUED | OUTPATIENT
Start: 2020-02-10 | End: 2020-02-10 | Stop reason: HOSPADM

## 2020-02-10 RX ORDER — SODIUM CHLORIDE, SODIUM LACTATE, POTASSIUM CHLORIDE, CALCIUM CHLORIDE 600; 310; 30; 20 MG/100ML; MG/100ML; MG/100ML; MG/100ML
50 INJECTION, SOLUTION INTRAVENOUS CONTINUOUS
Status: DISCONTINUED | OUTPATIENT
Start: 2020-02-10 | End: 2020-02-10 | Stop reason: HOSPADM

## 2020-02-10 RX ORDER — BUPIVACAINE HYDROCHLORIDE 5 MG/ML
INJECTION, SOLUTION EPIDURAL; INTRACAUDAL
Status: COMPLETED | OUTPATIENT
Start: 2020-02-10 | End: 2020-02-10

## 2020-02-10 RX ORDER — TRAMADOL HYDROCHLORIDE 50 MG/1
50 TABLET ORAL
Status: DISCONTINUED | OUTPATIENT
Start: 2020-02-10 | End: 2020-02-11 | Stop reason: HOSPADM

## 2020-02-10 RX ORDER — MORPHINE SULFATE 2 MG/ML
1 INJECTION, SOLUTION INTRAMUSCULAR; INTRAVENOUS
Status: DISCONTINUED | OUTPATIENT
Start: 2020-02-10 | End: 2020-02-11 | Stop reason: HOSPADM

## 2020-02-10 RX ORDER — ONDANSETRON 2 MG/ML
4 INJECTION INTRAMUSCULAR; INTRAVENOUS AS NEEDED
Status: DISCONTINUED | OUTPATIENT
Start: 2020-02-10 | End: 2020-02-10 | Stop reason: HOSPADM

## 2020-02-10 RX ORDER — SODIUM CHLORIDE 9 MG/ML
125 INJECTION, SOLUTION INTRAVENOUS CONTINUOUS
Status: DISCONTINUED | OUTPATIENT
Start: 2020-02-10 | End: 2020-02-11 | Stop reason: HOSPADM

## 2020-02-10 RX ORDER — ACETAMINOPHEN 500 MG
1000 TABLET ORAL ONCE
Status: COMPLETED | OUTPATIENT
Start: 2020-02-10 | End: 2020-02-10

## 2020-02-10 RX ORDER — SODIUM CHLORIDE, SODIUM LACTATE, POTASSIUM CHLORIDE, CALCIUM CHLORIDE 600; 310; 30; 20 MG/100ML; MG/100ML; MG/100ML; MG/100ML
INJECTION, SOLUTION INTRAVENOUS
Status: DISCONTINUED | OUTPATIENT
Start: 2020-02-10 | End: 2020-02-10 | Stop reason: HOSPADM

## 2020-02-10 RX ORDER — OXYCODONE HYDROCHLORIDE 5 MG/1
10 TABLET ORAL
Status: DISCONTINUED | OUTPATIENT
Start: 2020-02-10 | End: 2020-02-11 | Stop reason: HOSPADM

## 2020-02-10 RX ORDER — HYDROMORPHONE HYDROCHLORIDE 1 MG/ML
0.2 INJECTION, SOLUTION INTRAMUSCULAR; INTRAVENOUS; SUBCUTANEOUS
Status: DISCONTINUED | OUTPATIENT
Start: 2020-02-10 | End: 2020-02-10 | Stop reason: HOSPADM

## 2020-02-10 RX ORDER — LIDOCAINE HYDROCHLORIDE 10 MG/ML
0.1 INJECTION, SOLUTION EPIDURAL; INFILTRATION; INTRACAUDAL; PERINEURAL AS NEEDED
Status: DISCONTINUED | OUTPATIENT
Start: 2020-02-10 | End: 2020-02-10 | Stop reason: HOSPADM

## 2020-02-10 RX ORDER — MIDAZOLAM HYDROCHLORIDE 1 MG/ML
1 INJECTION, SOLUTION INTRAMUSCULAR; INTRAVENOUS AS NEEDED
Status: DISCONTINUED | OUTPATIENT
Start: 2020-02-10 | End: 2020-02-10 | Stop reason: HOSPADM

## 2020-02-10 RX ORDER — FENTANYL CITRATE 50 UG/ML
25 INJECTION, SOLUTION INTRAMUSCULAR; INTRAVENOUS
Status: DISCONTINUED | OUTPATIENT
Start: 2020-02-10 | End: 2020-02-10 | Stop reason: HOSPADM

## 2020-02-10 RX ORDER — DEXAMETHASONE SODIUM PHOSPHATE 4 MG/ML
INJECTION, SOLUTION INTRA-ARTICULAR; INTRALESIONAL; INTRAMUSCULAR; INTRAVENOUS; SOFT TISSUE AS NEEDED
Status: DISCONTINUED | OUTPATIENT
Start: 2020-02-10 | End: 2020-02-10 | Stop reason: HOSPADM

## 2020-02-10 RX ORDER — NALOXONE HYDROCHLORIDE 0.4 MG/ML
0.4 INJECTION, SOLUTION INTRAMUSCULAR; INTRAVENOUS; SUBCUTANEOUS AS NEEDED
Status: DISCONTINUED | OUTPATIENT
Start: 2020-02-10 | End: 2020-02-11 | Stop reason: HOSPADM

## 2020-02-10 RX ORDER — ASCORBIC ACID 500 MG
500 TABLET ORAL DAILY
Status: DISCONTINUED | OUTPATIENT
Start: 2020-02-11 | End: 2020-02-10

## 2020-02-10 RX ORDER — DIPHENHYDRAMINE HCL 12.5MG/5ML
12.5 ELIXIR ORAL
Status: DISCONTINUED | OUTPATIENT
Start: 2020-02-10 | End: 2020-02-11 | Stop reason: HOSPADM

## 2020-02-10 RX ORDER — CELECOXIB 200 MG/1
200 CAPSULE ORAL 2 TIMES DAILY
Status: DISCONTINUED | OUTPATIENT
Start: 2020-02-10 | End: 2020-02-11 | Stop reason: HOSPADM

## 2020-02-10 RX ORDER — MIDAZOLAM HYDROCHLORIDE 1 MG/ML
INJECTION, SOLUTION INTRAMUSCULAR; INTRAVENOUS AS NEEDED
Status: DISCONTINUED | OUTPATIENT
Start: 2020-02-10 | End: 2020-02-10 | Stop reason: HOSPADM

## 2020-02-10 RX ORDER — KETOTIFEN FUMARATE 0.35 MG/ML
1 SOLUTION/ DROPS OPHTHALMIC
Status: DISCONTINUED | OUTPATIENT
Start: 2020-02-10 | End: 2020-02-11 | Stop reason: HOSPADM

## 2020-02-10 RX ADMIN — ACETAMINOPHEN 1000 MG: 500 TABLET ORAL at 23:38

## 2020-02-10 RX ADMIN — BUPIVACAINE HYDROCHLORIDE 10 MG: 5 INJECTION, SOLUTION EPIDURAL; INTRACAUDAL; PERINEURAL at 12:10

## 2020-02-10 RX ADMIN — MIDAZOLAM HYDROCHLORIDE 1 MG: 1 INJECTION, SOLUTION INTRAMUSCULAR; INTRAVENOUS at 12:11

## 2020-02-10 RX ADMIN — ACETAMINOPHEN 1000 MG: 500 TABLET ORAL at 18:17

## 2020-02-10 RX ADMIN — ASPIRIN 325 MG: 325 TABLET, DELAYED RELEASE ORAL at 18:18

## 2020-02-10 RX ADMIN — TRANEXAMIC ACID 1 G: 100 INJECTION, SOLUTION INTRAVENOUS at 12:16

## 2020-02-10 RX ADMIN — SODIUM CHLORIDE, SODIUM LACTATE, POTASSIUM CHLORIDE, AND CALCIUM CHLORIDE 50 ML/HR: 600; 310; 30; 20 INJECTION, SOLUTION INTRAVENOUS at 11:45

## 2020-02-10 RX ADMIN — OXYCODONE 5 MG: 5 TABLET ORAL at 22:30

## 2020-02-10 RX ADMIN — Medication 10 ML: at 20:22

## 2020-02-10 RX ADMIN — VANCOMYCIN HYDROCHLORIDE 1 G: 1 INJECTION, POWDER, LYOPHILIZED, FOR SOLUTION INTRAVENOUS at 12:05

## 2020-02-10 RX ADMIN — SODIUM CHLORIDE 125 ML/HR: 900 INJECTION, SOLUTION INTRAVENOUS at 22:30

## 2020-02-10 RX ADMIN — SODIUM CHLORIDE, POTASSIUM CHLORIDE, SODIUM LACTATE AND CALCIUM CHLORIDE: 600; 310; 30; 20 INJECTION, SOLUTION INTRAVENOUS at 12:05

## 2020-02-10 RX ADMIN — PROPOFOL 25 MCG/KG/MIN: 10 INJECTION, EMULSION INTRAVENOUS at 12:07

## 2020-02-10 RX ADMIN — TRANEXAMIC ACID 1 G: 100 INJECTION, SOLUTION INTRAVENOUS at 12:17

## 2020-02-10 RX ADMIN — LISINOPRIL 10 MG: 10 TABLET ORAL at 22:31

## 2020-02-10 RX ADMIN — MIDAZOLAM HYDROCHLORIDE 1 MG: 1 INJECTION, SOLUTION INTRAMUSCULAR; INTRAVENOUS at 13:08

## 2020-02-10 RX ADMIN — PHENYLEPHRINE HYDROCHLORIDE 25 MCG/MIN: 10 INJECTION INTRAVENOUS at 12:07

## 2020-02-10 RX ADMIN — VANCOMYCIN HYDROCHLORIDE 1000 MG: 1 INJECTION, POWDER, LYOPHILIZED, FOR SOLUTION INTRAVENOUS at 23:38

## 2020-02-10 RX ADMIN — ONDANSETRON HYDROCHLORIDE 4 MG: 2 INJECTION, SOLUTION INTRAMUSCULAR; INTRAVENOUS at 12:26

## 2020-02-10 RX ADMIN — MIDAZOLAM HYDROCHLORIDE 1 MG: 1 INJECTION, SOLUTION INTRAMUSCULAR; INTRAVENOUS at 12:40

## 2020-02-10 RX ADMIN — ACETAMINOPHEN 1000 MG: 500 TABLET ORAL at 11:28

## 2020-02-10 RX ADMIN — MIDAZOLAM 2 MG: 1 INJECTION INTRAMUSCULAR; INTRAVENOUS at 11:51

## 2020-02-10 RX ADMIN — SODIUM CHLORIDE 125 ML/HR: 900 INJECTION, SOLUTION INTRAVENOUS at 14:43

## 2020-02-10 RX ADMIN — VANCOMYCIN HYDROCHLORIDE 1000 MG: 1 INJECTION, POWDER, LYOPHILIZED, FOR SOLUTION INTRAVENOUS at 11:50

## 2020-02-10 RX ADMIN — CELECOXIB 200 MG: 200 CAPSULE ORAL at 11:29

## 2020-02-10 RX ADMIN — MIDAZOLAM HYDROCHLORIDE 1 MG: 1 INJECTION, SOLUTION INTRAMUSCULAR; INTRAVENOUS at 12:07

## 2020-02-10 RX ADMIN — DEXAMETHASONE SODIUM PHOSPHATE 4 MG: 4 INJECTION, SOLUTION INTRAMUSCULAR; INTRAVENOUS at 12:26

## 2020-02-10 RX ADMIN — CELECOXIB 200 MG: 200 CAPSULE ORAL at 18:18

## 2020-02-10 RX ADMIN — TRAMADOL HYDROCHLORIDE 50 MG: 50 TABLET, FILM COATED ORAL at 20:18

## 2020-02-10 RX ADMIN — BUPIVACAINE HYDROCHLORIDE 30 ML: 5 INJECTION, SOLUTION EPIDURAL; INTRACAUDAL; PERINEURAL at 11:45

## 2020-02-10 RX ADMIN — MIDAZOLAM HYDROCHLORIDE 1 MG: 1 INJECTION, SOLUTION INTRAMUSCULAR; INTRAVENOUS at 13:43

## 2020-02-10 RX ADMIN — FENTANYL CITRATE 50 MCG: 50 INJECTION, SOLUTION INTRAMUSCULAR; INTRAVENOUS at 11:51

## 2020-02-10 RX ADMIN — SENNOSIDES AND DOCUSATE SODIUM 1 TABLET: 8.6; 5 TABLET ORAL at 18:18

## 2020-02-10 NOTE — ANESTHESIA PREPROCEDURE EVALUATION
Relevant Problems   No relevant active problems       Anesthetic History   No history of anesthetic complications            Review of Systems / Medical History  Patient summary reviewed, nursing notes reviewed and pertinent labs reviewed    Pulmonary  Within defined limits                 Neuro/Psych   Within defined limits           Cardiovascular    Hypertension              Exercise tolerance: >4 METS     GI/Hepatic/Renal  Within defined limits              Endo/Other      Hypothyroidism  Arthritis     Other Findings              Physical Exam    Airway  Mallampati: I    Neck ROM: normal range of motion   Mouth opening: Normal     Cardiovascular    Rhythm: regular  Rate: normal         Dental  No notable dental hx       Pulmonary  Breath sounds clear to auscultation               Abdominal         Other Findings            Anesthetic Plan    ASA: 2  Anesthesia type: spinal          Induction: Intravenous  Anesthetic plan and risks discussed with: Patient

## 2020-02-10 NOTE — ANESTHESIA PROCEDURE NOTES
Spinal Block    Start time: 2/10/2020 12:06 PM  End time: 2/10/2020 12:10 PM  Performed by: Morenita Rivera CRNA  Authorized by: Mary Snow MD     Pre-procedure:  Preanesthetic Checklist: patient identified, risks and benefits discussed, anesthesia consent, site marked, patient being monitored and timeout performed    Timeout Time: 12:05          Spinal Block:   Patient Position:  Seated  Prep Region:  Lumbar  Prep: Betadine      Location:  L3-4  Technique:  Single shot    Local Dose (mL):  2    Needle:   Needle Type:  Pencan  Needle Gauge:  24 G  Attempts:  1      Events: CSF confirmed, no blood with aspiration and no paresthesia        Assessment:  Insertion:  Uncomplicated  Patient tolerance:  Patient tolerated the procedure well with no immediate complications

## 2020-02-10 NOTE — PROGRESS NOTES
TRANSFER - IN REPORT:    Verbal report received from [facility]) on Sumanth Sa  being received from PACU(unit) for routine post - op      Report consisted of patients Situation, Background, Assessment and   Recommendations(SBAR). Information from the following report(s) SBAR, Kardex, Intake/Output and MAR was reviewed with the receiving nurse. Opportunity for questions and clarification was provided. Assessment completed upon patients arrival to unit and care assumed.

## 2020-02-10 NOTE — H&P
Date of Surgery Update:  Ofelia Villeda was seen and examined. History and physical has been reviewed. The patient has been examined.  There have been no significant clinical changes since the completion of the originally dated History and Physical.    Signed By: Kathryn Blackburn MD     February 10, 2020 7:35 AM

## 2020-02-10 NOTE — PERIOP NOTES
TRANSFER - OUT REPORT:    Verbal report given to Slime Hutchins RN on Ofelia Villeda  being transferred to room 570 for routine post - op       Report consisted of patients Situation, Background, Assessment and   Recommendations(SBAR). Time Pre op antibiotic given:1 gram vanc  Anesthesia Stop time: 9445  Rai Present on Transfer to floor:  Order for Rai on Chart:  Discharge Prescriptions with Chart:    Information from the following report(s) SBAR, OR Summary, Intake/Output and MAR was reviewed with the receiving nurse. Opportunity for questions and clarification was provided. Is the patient on 02? YES       L/Min 2       Other     Is the patient on a monitor? NO    Is the nurse transporting with the patient? NO    Surgical Waiting Area notified of patient's transfer from PACU?  YES      The following personal items collected during your admission accompanied patient upon transfer:   Dental Appliance: Dental Appliances: None  Vision:    Hearing Aid:    Jewelry:    Clothing:    Other Valuables:    Valuables sent to safe:

## 2020-02-10 NOTE — PROGRESS NOTES
Primary Nurse Francesco Walter, RN and Therese Santamaria RN performed a dual skin assessment on this patient No impairment noted  Cuauhtemoc score is 21

## 2020-02-10 NOTE — PROGRESS NOTES
Problem: Mobility Impaired (Adult and Pediatric)  Goal: *Acute Goals and Plan of Care (Insert Text)  Description  FUNCTIONAL STATUS PRIOR TO ADMISSION: Patient was independent and active without use of DME.    HOME SUPPORT PRIOR TO ADMISSION: The patient lived alone with daughter to provide assistance if needed. Physical Therapy Goals  Initiated 2/10/2020    1. Patient will move from supine to sit and sit to supine , scoot up and down and roll side to side in bed with modified independence within 4 days. 2. Patient will perform sit to stand with modified independence within 4 days. 3. Patient will ambulate with modified independence for 150 feet with the least restrictive device within 4 days. 4. Patient will ascend/descend 4 stairs with cane and one handrail(s) with modified independence within 4 days. 5. Patient will perform home exercise program per protocol with independence within 4 days. 6. Patient will demonstrate AROM 0-90 degrees in operative joint within 4 days. Outcome: Progressing Towards Goal   PHYSICAL THERAPY EVALUATION  Patient: Marisa Yan (50 y.o. female)  Date: 2/10/2020  Primary Diagnosis: Left knee DJD [M17.12]  Procedure(s) (LRB):  LEFT TOTAL KNEE ARTHROPLASTY (Left) Day of Surgery   Precautions:   Fall, WBAT      ASSESSMENT  Based on the objective data described below, the patient presents with  impairment in functional mobility, activity tolerance and balance s/p L TKA. PLOF: Independent with ADLs and IADLs. Patient lives alone in a one story home (basement optional) with steps and rails to enter. Daughter will be staying with patient as needed upon discharge. Patient's mobility was on target for POD#0. Will address more exercises, increase gait distance, negotiate stairs and assess for discharge at am PT session tomorrow. Initiated post-TKA exercises per protocol. Patient instructed NOT to get up from bed, chair or commode without calling for assistance.  She will benefit from [facility] Valeriano Escobar PT in order to achieve maximum level of safe, functional mobility, balance and return to independent PLOF. Current Level of Function Impacting Discharge (mobility/balance): All mobility performed with contact guard assistance. Ambulated 54 ft with RW, steady but slightly antalgic gait. Functional Outcome Measure: The patient scored 45/100 on the Barthel outcome measure which is indicative of moderate dependence on caregivers for assistance with mobility and ADLs. Other factors to consider for discharge: Motivated/A & O x 4/Supportive Family/Independent PLOF      Patient will benefit from skilled therapy intervention to address the above noted impairments. PLAN :  Recommendations and Planned Interventions: bed mobility training, transfer training, gait training, therapeutic exercises, patient and family training/education, and therapeutic activities      Frequency/Duration: Patient will be followed by physical therapy:  twice daily to address goals. Recommendation for discharge: (in order for the patient to meet his/her long term goals)  Physical therapy at least 2 days/week in the home     This discharge recommendation:  Has been made in collaboration with the attending provider and/or case management    IF patient discharges home will need the following DME: patient owns DME required for discharge         SUBJECTIVE:   Patient stated I am nervous.     OBJECTIVE DATA SUMMARY:   HISTORY:    Past Medical History:   Diagnosis Date    Arthritis     Bruises easily     Diverticulosis     Hypertension     Thyroid disease     HYPOTHYROIDISM     Past Surgical History:   Procedure Laterality Date    HX APPENDECTOMY      HX CARPAL TUNNEL RELEASE Bilateral     HX CATARACT REMOVAL Bilateral 01/18/2017    HX COLONOSCOPY      HX GYN      hysterectomy    HX GYN      vaginal delivery x 1    HX OOPHORECTOMY Bilateral     HX ORTHOPAEDIC Left     TRIGGER FINGER RELEASE    HX ORTHOPAEDIC Right     carpal tunnel    HX ORTHOPAEDIC Left        Personal factors and/or comorbidities impacting plan of care: Motivated/A & O x 4/Supportive Family/Independent PLOF     Home Situation  Home Environment: Private residence  # Steps to Enter: 4  Rails to Enter: Yes  Hand Rails : Bilateral  Wheelchair Ramp: No  One/Two Story Residence: One story  Living Alone: Yes  Support Systems: Child(elie)  Patient Expects to be Discharged to[de-identified] Private residence  Current DME Used/Available at Home: Tub transfer bench, Walker, rolling, Cane, straight, Raised toilet seat  Tub or Shower Type: Tub/Shower combination    EXAMINATION/PRESENTATION/DECISION MAKING:   Critical Behavior:  Neurologic State: Alert  Orientation Level: Appropriate for age  Cognition: Appropriate decision making, Appropriate for age attention/concentration     Hearing: Auditory  Auditory Impairment: None    Range Of Motion:  AROM: Generally decreased, functional           PROM: Generally decreased, functional           Strength:    Strength: Generally decreased, functional                    Tone & Sensation:   Tone: Normal              Sensation: Intact               Coordination:  Coordination: Within functional limits  Vision:      Functional Mobility:  Bed Mobility:     Supine to Sit: Contact guard assistance  Sit to Supine: Contact guard assistance  Scooting: Contact guard assistance  Transfers:  Sit to Stand: Contact guard assistance  Stand to Sit: Contact guard assistance                       Balance:   Sitting: Intact  Standing: Impaired; Without support  Standing - Static: Good;Constant support  Standing - Dynamic : Good;Constant support  Ambulation/Gait Training:  Distance (ft): 55 Feet (ft)  Assistive Device: Walker, rolling;Gait belt  Ambulation - Level of Assistance: Contact guard assistance        Gait Abnormalities: Antalgic;Decreased step clearance; Step to gait     Left Side Weight Bearing: As tolerated  Base of Support: Shift to right  Stance: Left decreased  Speed/Shell: Slow  Step Length: Right shortened  Swing Pattern: Left asymmetrical              Therapeutic Exercises: Ankle Pumps  Quad sets (5 second hold)  Glute sets (5 second hold)  X 10 reps every hour   Heel slides x 10      Functional Measure:  Barthel Index:    Bathin  Bladder: 10  Bowels: 10  Groomin  Dressin  Feeding: 10  Mobility: 0  Stairs: 0  Toilet Use: 5  Transfer (Bed to Chair and Back): 10  Total: 45/100       The Barthel ADL Index: Guidelines  1. The index should be used as a record of what a patient does, not as a record of what a patient could do. 2. The main aim is to establish degree of independence from any help, physical or verbal, however minor and for whatever reason. 3. The need for supervision renders the patient not independent. 4. A patient's performance should be established using the best available evidence. Asking the patient, friends/relatives and nurses are the usual sources, but direct observation and common sense are also important. However direct testing is not needed. 5. Usually the patient's performance over the preceding 24-48 hours is important, but occasionally longer periods will be relevant. 6. Middle categories imply that the patient supplies over 50 per cent of the effort. 7. Use of aids to be independent is allowed. Huseyin Antoine., Barthel, D.W. (0885). Functional evaluation: the Barthel Index. [facility] (14)2. KETAN HuntF, Amy Owusu., Fermin Garsia., [place], [address] [place] [address] (). Measuring the change indisability after inpatient rehabilitation; comparison of the responsiveness of the Barthel Index and Functional [place] Measure. Journal of Neurology, Neurosurgery, and Psychiatry, 66(4), 403-199. Armani Alva, N.J.A, RUDY RomeroJ.M, & Nirmal Mcfadden M.A. (2004.) Assessment of post-stroke quality of life in cost-effectiveness studies: The usefulness of the Barthel Index and the EuroQoL-5D. Quality of Life Research, 15, 147-55           Physical Therapy Evaluation Charge Determination   History Examination Presentation Decision-Making   LOW Complexity : Zero comorbidities / personal factors that will impact the outcome / POC LOW Complexity : 1-2 Standardized tests and measures addressing body structure, function, activity limitation and / or participation in recreation  LOW Complexity : Stable, uncomplicated  LOW Complexity : FOTO score of       Based on the above components, the patient evaluation is determined to be of the following complexity level: LOW     Pain Rating:  3/10    Activity Tolerance:   Good  Please refer to the flowsheet for vital signs taken during this treatment. Vitals:    02/10/20 1600 02/10/20 1625 02/10/20 1647 02/10/20 1705   BP: 146/65 (!) 195/95 (!) 205/95 188/83   BP 1 Location:  Right arm Right arm Right arm   BP Patient Position:  Post activity Sitting;During activity Post activity; Head of bed elevated (Comment degrees); Other (comment)  Comment: 35 degrees   Pulse: (!) 51 (!) 57 61    Resp: 18 17     Temp:  97.5 °F (36.4 °C)     SpO2: 100% 97% 97%    Weight:       Height:            After treatment patient left in no apparent distress:   Supine in bed, Call bell within reach, Caregiver / family present, Side rails x 3, and nurse notified. COMMUNICATION/EDUCATION:   The patients plan of care was discussed with: Registered Nurse. Fall prevention education was provided and the patient/caregiver indicated understanding., Patient/family have participated as able in goal setting and plan of care. , and Patient/family agree to work toward stated goals and plan of care.     Thank you for this referral.  Zena Aldridge   Time Calculation: 30 mins

## 2020-02-10 NOTE — ANESTHESIA POSTPROCEDURE EVALUATION
Post-Anesthesia Evaluation and Assessment    Patient: Laine Lord MRN: [id number]  SSN: [id number]    YOB: 1945  Age: 76 y.o. Sex: female      I have evaluated the patient and they are stable and ready for discharge from the PACU. Cardiovascular Function/Vital Signs  Visit Vitals  /58   Pulse (!) 54   Temp 36.5 °C (97.7 °F)   Resp 19   Ht 5' 3\" (1.6 m)   Wt 74.9 kg (165 lb 2 oz)   SpO2 99%   BMI 29.25 kg/m²       Patient is status post Spinal anesthesia for Procedure(s):  LEFT TOTAL KNEE ARTHROPLASTY. Nausea/Vomiting: None    Postoperative hydration reviewed and adequate. Pain:  Pain Scale 1: FLACC (02/10/20 1407)  Pain Intensity 1: 1 (02/10/20 1059)   Managed    Neurological Status:   Neuro (WDL): Exceptions to WDL(sedate from anesthesia) (02/10/20 1407)   At baseline    Mental Status, Level of Consciousness: Alert and  oriented to person, place, and time    Pulmonary Status:   O2 Device: Nasal cannula (02/10/20 1407)   Adequate oxygenation and airway patent    Complications related to anesthesia: None    Post-anesthesia assessment completed. No concerns    Signed By: Nathen Ocampo MD     February 10, 2020              Procedure(s):  LEFT TOTAL KNEE ARTHROPLASTY. spinal    <BSHSIANPOST>    Vitals Value Taken Time   /58 2/10/2020  2:45 PM   Temp 36.5 °C (97.7 °F) 2/10/2020  2:07 PM   Pulse 48 2/10/2020  2:59 PM   Resp 14 2/10/2020  2:59 PM   SpO2 100 % 2/10/2020  2:59 PM   Vitals shown include unvalidated device data.

## 2020-02-10 NOTE — H&P
Clovis Downing  Location: - SHORT PUMP OFFICE  Patient #: [id number]  : 1945   / Language: [place] / Race: White  Female      History of Present Illness Ayana Ernst MD; 2019 5:19 PM)  The patient is a 76year old female who presents with a complaint of Knee Pain. The onset of the knee pain has been gradual and has been occurring in a persistent pattern for 3 years. The course has been gradually worsening. The knee pain is severe. The knee pain is characterized as a dull aching. The knee pain is described as being located in the entire knee (left). The knee pain is aggravated by kneeling and stairs. The knee pain is relieved by rest, heat and ice. Note for \"Knee Pain\": Patient presents today with increasing knee pain over the last several years. She has been treated by another physician with cortisone injections for many years. Her last injection provided 17 days of relief. She no longer experiences any relief from OTC medication either. She has difficult taking care of her lawn and house due to the pain. She has had to quit going to gym because of her pain. Pain is primarily medial. It wakes her from sleep. Symptoms do not radiate. Problem List/Past Medical Bib Smith; 2019 4:48 PM)  Essential Hypertension    HAND, PAIN (719.43)    REVIEW OF SYSTEMS: Systems were reviewed by the provider.    FOLLOW-UP AFTER SURGERY (V67.00)    DIETARY COUNSELING (V65.3)    TRIGGER FINGER (727.03  M65.30)      Allergies Bib Govea [facility]; 2019 4:48 PM)  Cefuroxime Sodium *CEPHALOSPORINS*   \"Stomach Problems\". Allergies Reconciled      Family History Bib Smith; 2019 4:48 PM)  Arthritis   mother  Bladder problems   mother  First Degree Relatives   recorded  Hypertension   mother    Social History Bib Smith; 2019 4:48 PM)  Jeral Deter Exposure   occasionally  Tobacco / smoke exposure   yes  Tobacco use   Never smoked.   Marital status     Never smoker    Seat Belt Use   always  Illicit drug use   none  Caffeine use   12/11/2014: Drinks coffee, tea and soft drinks, 3-4 drinks per day. Current work status   retired  Exercise   12/11/2014: Walks 1-2 times per week. Alcohol use   12/11/2014: Never drinks. Medication History Diana Govea [facility]; 11/26/2019 4:48 PM)  amLODIPine Besy-Benazepril HCl  (5-20MG Capsule, Oral) Active. amLODIPine Besylate  (5MG Tablet, Oral) Active. Doxycycline Hyclate  (100MG Capsule, Oral) Active. levoFLOXacin  (500MG Tablet, Oral) Active. Levothyroxine Sodium  (75MCG Tablet, Oral) Active. Medications Reconciled     Pregnancy / Birth History Diana Adair; 11/26/2019 4:48 PM)  Pregnant   no    Past Surgical History Diana Adair; 11/26/2019 4:48 PM)  Tubal Ligation    Appendectomy    Carpal Tunnel Repair   bilateral  Hysterectomy   non-cancerous: complete        Review of Systems Diana Govea [facility]; 11/26/2019 4:48 PM)  General Not Present- Chills and Fatigue. Skin Not Present- Bruising, Pallor and Skin Color Changes. Respiratory Not Present- Cough and Difficulty Breathing. Cardiovascular Not Present- Chest Pain, Fainting / Blacking Out and Rapid Heart Rate. Musculoskeletal Present- Joint Pain. Not Present- Decreased Range of Motion and Joint Swelling. Neurological Not Present- Dysesthesia, Paresthesias and Weakness In Extremities. Hematology Not Present- Abnormal Bleeding, Blood Clots and Petechiae. Vitals Diana Govea [facility]; 11/26/2019 4:46 PM)  11/26/2019 4:46 PM  Weight: 176 lb   Height: 63.5 in   Weight was reported by patient. Height was reported by patient.   Body Surface Area: 1.84 m²   Body Mass Index: 30.69 kg/m²                Physical Exam Eveline Florez MD; 11/26/2019 5:21 PM)  Musculoskeletal  Global Assessment  Examination of related systems reveals - well-developed, well-nourished, in no acute distress, alert and oriented x 3, no rashes or ulcers of bilateral upper and lower extremities, head or trunk, no generalized swelling or edema of extremities and normal coordination. Gait and Station - normal gait and station and normal posture. Left Lower Extremity - Note: Left knee was examined. Knee is ligamentously stable. Medial joint line is tender to palpation. Patellofemoral crepitation throughout the arc of motion. Antalgic gait with noticeable varus deformit of the left knee. Left hip has painless ROM. Left lower extremity is sensate and well-perfused; there is a palpable dorsalis pedis pulse. 5/5 strength throughout left lower extremity. Assessment & Plan Shelby Palomino MD; 11/26/2019 5:24 PM)  Primary osteoarthritis of left knee (715.16  M17.12)  Impression: Patient presents today with end-stage DJD of the left knee. Treatment options were discussed. She has exhausted most conservative treatment measures and is interested in knee replacement surgery; this is why she was referred here by her PCP. She will try a monovisc injection today and proceed with knee replacement surgery early next year. Risks and benefits were discussed and all questions were answered. Knee was injected today without issue. We will see her the day of surgery. Current Plans  Pt Education - How to [address] using Patient Portal and 3rd Party Apps: discussed with patient and provided information. Pt Education - Educational materials were provided.: discussed with patient and provided information. X-RAY EXAM OF KNEE 3 VIEWS (65092) (PA flex, left Lateral and bilateral [place] Patella views were taken today using Digital Radiography. 3 views of the left knee reveal bone-on-bone osteoarthritis of the medial and patellofemoral compartment; no acute issues noted)  MAJOR JOINT - KNEE, HIP, SHOULDER (22048)  MONOVISC 22MG/ML () (4ML) (Treatment options were discussed, including risks and benefits.  The patient elected to proceed with Visco supplementation injections. Under sterile prep the patient was injected.  The patient tolerated the procedure without complications.)  REVIEW OF SYSTEMS: Systems were reviewed by the provider.(V49.9)        Signed by Marcia Brito MD

## 2020-02-10 NOTE — OP NOTES
Name: Alex Mora  MRN:  [id number]  : 1945  Age:  76 y.o. Surgery Date: 2/10/2020      OPERATIVE REPORT - LEFT TOTAL KNEE REPLACEMENT    PREOPERATIVE DIAGNOSIS: Osteoarthritis, left knee. POSTOPERATIVE DIAGNOSIS: Osteoarthritis, left knee. PROCEDURE PERFORMED: Left total knee arthroplasty. SURGEON: Adrian Vivar MD    FIRST ASSISTANT: Ani Velazco PA-C    ANESTHESIA: Spinal    PRE-OP ANTIBIOTIC: Ancef 1g    COMPLICATIONS: None. ESTIMATED BLOOD LOSS: 50 mL. SPECIMENS REMOVED: None. COMPONENTS IMPLANTED:   Implant Name Type Inv. Item Serial No.  Lot No. LRB No. Used Action   CEMENT BNE GENTAMC GHV 40GM -- SMARTSET - SN/A  CEMENT BNE GENTAMC GHV 40GM -- SMARTSET N/A [id number] [id number] Left 1 Implanted   CEMENT BNE GENTAMC GHV 40GM -- SMARTSET - SN/A  CEMENT BNE GENTAMC GHV 40GM -- SMARTSET N/A [facility] [employer] [id number] Left 1 Implanted   FEM CR KELLY CCR NRW SZ 6 LT -- PERSONA - SN/A  FEM CR KELLY CCR NRW SZ 6 LT -- PERSONA N/A [employer] [id number] Left 1 Implanted   Persona Crosslinked Knee System - Articular Surface Medial Congruent    N/A [employer] [id number] Left 1 Implanted   INSERT ALL POLY PAT PLY 32MM -- PERSONA - SN/A  INSERT ALL POLY PAT PLY 32MM -- PERSONA N/A [employer] [id number] Left 1 Implanted   TIB PRN NP STM 5 DEG SZ CL -- PERSONA - [id number]  TIB PRN NP STM 5 DEG SZ CL -- PERSONA [id number] [employer] N/A Left 1 Implanted       INDICATIONS: The patient is an 76 yrs female with progressive debilitating left knee pain due to severe osteoarthritis. Symptoms have progressed despite comprehensive conservative treatment and the patient presents for left total knee replacement. Risks, benefits, alternatives of the procedure were reviewed with the patient in detail and the patient desires to proceed. The patient understands the risk for perioperative medical complications. DESCRIPTION OF PROCEDURE: Spinal anesthesia was initiated.  Preoperative dose of antibiotic was given. Rai catheter was not placed. The left lower extremity was prepped and draped in the usual sterile fashion. The skin was covered with Ioban occlusive dressing. The left lower extremity was exsanguinated. A medial parapatellar incision was made to the left knee. The left knee was exposed and the distal femur was cut at 5 degrees distal femoral valgus. Femur was sized for a size 6. An AP cutting block was placed, rotated based on bony landmarks. Femoral cuts were completed for a size 6. The tibia was subluxed and a neutral varus/valgus proximal tibial cut was made matching the patient's slope. The meniscal remnants were removed. The posterior osteophytes were removed from the femur. Gaps were examined. The flexion and extension gaps were 10 mm. The femur was finished for a 6, tibia for a C. Trials were placed. Patella was cut and a 32 mm trial was fitted . The knee tracked well with all trial implants. The trials were then removed. Bony surfaces were drilled, lavaged, and dried. All components were cemented. Excess cement was removed. A 10 mm polyethylene component was placed. After the cement had fully cured, the knee was ranged and  irrigated copiously with pulsatile lavage. All surrounding soft tissues were infiltrated with local anesthetic. The arthrotomy was closed with #2 Vicryl sutures and 0 Vicryl sutures. Skin and subcutaneous tissues were irrigated and closed in standard fashion. Sterile dressing was applied. There were no complications. The procedure was a LEFT TOTAL KNEE REPLACEMENT. A Tracey total knee construct was utilized. No specimens were obtained/sent. The patient was transferred to the recovery room in stable condition. PCL intact. JUAN Resendiz PA-C was critical throughout the case to assist with positioning, retraction and closure. There were no other available residents or surgical assistants to assist during this procedure.       Kendell Arroyo Beata Crook MD

## 2020-02-10 NOTE — ANESTHESIA PROCEDURE NOTES
Peripheral Block    Start time: 2/10/2020 11:30 AM  End time: 2/10/2020 11:45 AM  Performed by: Grayson Le MD  Authorized by: Grayson Le MD       Pre-procedure: Indications: at surgeon's request and procedure for pain    Preanesthetic Checklist: patient identified, risks and benefits discussed, site marked, timeout performed, anesthesia consent given and patient being monitored    Timeout Time: 11:30          Block Type:   Block Type:   Adductor canal  Monitoring:  Oxygen  Injection Technique:  Single shot  Procedures: ultrasound guided    Patient Position: supine  Prep: chlorhexidine    Location:  Lower thigh  Needle Type:  Stimuplex  Needle Localization:  Ultrasound guidance  Motor Response: minimal motor response >0.4 mA      Assessment:  Number of attempts:  1  Injection Assessment:  Ultrasound image on chart, no paresthesia, negative aspiration for CSF, incremental injection every 5 mL, low pressure verified by pressure monitor, no intravascular symptoms, negative aspiration for blood and local visualized surrounding nerve on ultrasound  Patient tolerance:  Patient tolerated the procedure well with no immediate complications

## 2020-02-11 VITALS
OXYGEN SATURATION: 92 % | DIASTOLIC BLOOD PRESSURE: 60 MMHG | WEIGHT: 165.13 LBS | RESPIRATION RATE: 16 BRPM | SYSTOLIC BLOOD PRESSURE: 133 MMHG | HEIGHT: 63 IN | HEART RATE: 70 BPM | BODY MASS INDEX: 29.26 KG/M2 | TEMPERATURE: 98.3 F

## 2020-02-11 LAB
ANION GAP SERPL CALC-SCNC: 5 MMOL/L (ref 5–15)
BUN SERPL-MCNC: 11 MG/DL (ref 6–20)
BUN/CREAT SERPL: 15 (ref 12–20)
CALCIUM SERPL-MCNC: 8.3 MG/DL (ref 8.5–10.1)
CHLORIDE SERPL-SCNC: 111 MMOL/L (ref 97–108)
CO2 SERPL-SCNC: 23 MMOL/L (ref 21–32)
CREAT SERPL-MCNC: 0.74 MG/DL (ref 0.55–1.02)
GLUCOSE SERPL-MCNC: 136 MG/DL (ref 65–100)
HGB BLD-MCNC: 12 G/DL (ref 11.5–16)
POTASSIUM SERPL-SCNC: 3.9 MMOL/L (ref 3.5–5.1)
SODIUM SERPL-SCNC: 139 MMOL/L (ref 136–145)

## 2020-02-11 PROCEDURE — 77030027138 HC INCENT SPIROMETER -A

## 2020-02-11 PROCEDURE — 85018 HEMOGLOBIN: CPT

## 2020-02-11 PROCEDURE — 36415 COLL VENOUS BLD VENIPUNCTURE: CPT

## 2020-02-11 PROCEDURE — 97116 GAIT TRAINING THERAPY: CPT

## 2020-02-11 PROCEDURE — 74011250637 HC RX REV CODE- 250/637: Performed by: PHYSICIAN ASSISTANT

## 2020-02-11 PROCEDURE — 74011250637 HC RX REV CODE- 250/637: Performed by: NURSE PRACTITIONER

## 2020-02-11 PROCEDURE — 99218 HC RM OBSERVATION: CPT

## 2020-02-11 PROCEDURE — 80048 BASIC METABOLIC PNL TOTAL CA: CPT

## 2020-02-11 PROCEDURE — 97110 THERAPEUTIC EXERCISES: CPT

## 2020-02-11 PROCEDURE — 77030028907 HC WRP KNEE WO BGS SOLM -B

## 2020-02-11 RX ORDER — ASPIRIN 325 MG
325 TABLET, DELAYED RELEASE (ENTERIC COATED) ORAL 2 TIMES DAILY
Qty: 30 TAB | Refills: 1 | Status: SHIPPED | OUTPATIENT
Start: 2020-02-11 | End: 2021-11-16

## 2020-02-11 RX ORDER — OXYCODONE HYDROCHLORIDE 5 MG/1
5 TABLET ORAL
Qty: 60 TAB | Refills: 0 | Status: SHIPPED | OUTPATIENT
Start: 2020-02-11 | End: 2020-03-12

## 2020-02-11 RX ORDER — CELECOXIB 200 MG/1
200 CAPSULE ORAL 2 TIMES DAILY
Qty: 60 CAP | Refills: 2 | Status: SHIPPED | OUTPATIENT
Start: 2020-02-11 | End: 2020-05-11

## 2020-02-11 RX ADMIN — CELECOXIB 200 MG: 200 CAPSULE ORAL at 08:57

## 2020-02-11 RX ADMIN — OXYCODONE 5 MG: 5 TABLET ORAL at 13:37

## 2020-02-11 RX ADMIN — LEVOTHYROXINE SODIUM 75 MCG: 75 TABLET ORAL at 06:21

## 2020-02-11 RX ADMIN — OXYCODONE 5 MG: 5 TABLET ORAL at 08:57

## 2020-02-11 RX ADMIN — ACETAMINOPHEN 1000 MG: 500 TABLET ORAL at 12:13

## 2020-02-11 RX ADMIN — LISINOPRIL 20 MG: 20 TABLET ORAL at 08:57

## 2020-02-11 RX ADMIN — SENNOSIDES AND DOCUSATE SODIUM 1 TABLET: 8.6; 5 TABLET ORAL at 08:57

## 2020-02-11 RX ADMIN — PSYLLIUM HUSK 1 PACKET: 3.4 POWDER ORAL at 08:57

## 2020-02-11 RX ADMIN — ACETAMINOPHEN 1000 MG: 500 TABLET ORAL at 06:21

## 2020-02-11 RX ADMIN — ASPIRIN 325 MG: 325 TABLET, DELAYED RELEASE ORAL at 08:57

## 2020-02-11 RX ADMIN — METOPROLOL SUCCINATE 50 MG: 50 TABLET, EXTENDED RELEASE ORAL at 08:58

## 2020-02-11 NOTE — ROUTINE PROCESS
Bedside shift change report given to Alverto Buckley (oncoming nurse) by John Tellez (offgoing nurse). Report included the following information SBAR.

## 2020-02-11 NOTE — PROGRESS NOTES
Problem: Knee Replacement: Day of Surgery/Unit  Goal: Activity/Safety  Outcome: Progressing Towards Goal  Note:   Up with physical therapy, tolerated well

## 2020-02-11 NOTE — PROGRESS NOTES
Bedside shift change report given to [id number] (oncoming nurse) by Ignacio Arroyo (offgoing nurse). Report included the following information SBAR, Kardex, Intake/Output and MAR.

## 2020-02-11 NOTE — CONSULTS
Hospitalist H&P   Sarah Amaro PA-C  Cell [phone] (7pm-7am)     Date of Service: 2/10/2020  Primary Care Provider: Elis Mata MD  Source of Information: Patient, chart review    History of Presenting Illness:   Manuel Gilman is a 76 y.o. female with past medical history of hypertension, hypothyroidism, diverticulitis admitted for left knee arthroplasty by Dr Irene Hilton. Feels chronic knee pain is due to long history of using affected leg to help lift heavy objects at job. No known injury. Patient is POD #0. The hospitalist group is consulted for management of hypertension. Patient takes benazepril and metoprolol at home; benazepril was held for the day of surgery. After returning to the floor from the OR, patient's blood pressure is found to be elevated 191/90. Patient reports regular follow up with primary care provider, has been adjusting home medications recently and before settling on current regimen. Denies chest pain, palpitations, nausea, vomiting, diaphoresis, syncope, weakness, fever, or recent illness.     Tobacco: Denies  Illicit drug use: Denies  Alcohol use: Denies  Functional status prior to admission: Ambulates without assistive device     Assessment & Plan     Hypertension, hx of  - Reports being well controlled at home on regimen of benazepril and Toprol XL  - Skipped Benazepril this AM as instructed, took Toprol this morning  - Elevated now 191/90, HR in the 60s  - Plan to resume home ACE-I (or equivalent per formulary), give dose tonight  - Hydralazine PRN    Hypothyroidism  - Prescribed by PCP, states has regular TSH monitoring  - Continue home dose Levothyroxine    Diverticulosis, hx of  - Reports two prior episodes of diverticulitis, sees GI outpatient on a regular basis  - No abdominal pain, nausea, vomiting, diarrhea, constipation at present  - Takes metamucil BID at home as directed by GI, will continue    DJD of the left knee  - POD #0 s/p arthroplasty  - Management per primary team    DVT Prophylaxis: Per primary team  Code status: Full  Disposition: Per primary team     Review of Systems:  Review of Systems   Constitutional: Negative for chills, fever and malaise/fatigue. HENT: Negative for congestion, sinus pain and sore throat. Respiratory: Negative for cough, shortness of breath and wheezing. Cardiovascular: Negative for chest pain, palpitations and leg swelling. Gastrointestinal: Negative for abdominal pain, blood in stool, constipation, diarrhea, heartburn, melena, nausea and vomiting. Genitourinary: Negative for dysuria, frequency, hematuria and urgency. Musculoskeletal: Positive for joint pain. Neurological: Negative for dizziness, weakness and headaches. Past Medical History:   Diagnosis Date    Arthritis     Bruises easily     Diverticulosis     Hypertension     Thyroid disease     HYPOTHYROIDISM      Past Surgical History:   Procedure Laterality Date    HX APPENDECTOMY      HX CARPAL TUNNEL RELEASE Bilateral     HX CATARACT REMOVAL Bilateral 01/18/2017    HX COLONOSCOPY      HX GYN      hysterectomy    HX GYN      vaginal delivery x 1    HX OOPHORECTOMY Bilateral     HX ORTHOPAEDIC Left     TRIGGER FINGER RELEASE    HX ORTHOPAEDIC Right     carpal tunnel    HX ORTHOPAEDIC Left      Prior to Admission medications    Medication Sig Start Date End Date Taking? Authorizing Provider   benazepriL (LOTENSIN) 20 mg tablet Take 20 mg by mouth daily. Yes Provider, Historical   metoprolol succinate (TOPROL XL) 50 mg XL tablet Take 50 mg by mouth daily. Yes Provider, Historical   ascorbic acid, vitamin C, (VITAMIN C) 500 mg tablet Take 500 mg by mouth daily. Yes Provider, Historical   ketotifen (ZADITOR) 0.025 % (0.035 %) ophthalmic solution Administer 1 Drop to both eyes two (2) times a day. Yes Provider, Historical   levothyroxine (SYNTHROID) 75 mcg tablet Take 75 mcg by mouth Daily (before breakfast).  4/3/19  Yes Other, MD Pam PSYLLIUM SEED, WITH SUGAR, (METAMUCIL PO) Take  by mouth as needed. Yes Provider, Historical   olopatadine (PATADAY) 0.2 % drop ophthalmic solution Administer 1 Drop to both eyes two (2) times daily as needed. 4/19/19   Other, MD Pam     Allergies   Allergen Reactions    Amlodipine Swelling    Cefuroxime Other (comments)     Tightness in abd      Family History   Problem Relation Age of Onset    Cancer Maternal Grandfather     Colon Cancer Maternal Grandfather     Alzheimer Mother     Other Father         LEGALLY BLIND    Anesth Problems Neg Hx         SOCIAL HISTORY:  Patient resides:  Independently [x]   Assisted Living []   SNF []   With family care []      Smoking history:   None [x]   Former []   Chronic []     Alcohol history:   None [x]   Social []   Chronic []     Ambulates:   Independently [x]   W/cane []   W/walker []   W/wc []     CODE STATUS:  DNR []   Full [x]   Other []     Objective:   Physical Exam:   Visit Vitals  /90 (BP 1 Location: Right arm, BP Patient Position: At rest)   Pulse 67   Temp 98.9 °F (37.2 °C)   Resp 16   Ht 5' 3\" (1.6 m)   Wt 74.9 kg (165 lb 2 oz)   SpO2 94%   BMI 29.25 kg/m²    O2 Flow Rate (L/min): 2 l/min O2 Device: Room air    Physical Exam  Vitals signs and nursing note reviewed. Constitutional:       General: She is not in acute distress. Appearance: She is well-developed. She is not diaphoretic. HENT:      Head: Normocephalic and atraumatic. Eyes:      General: No scleral icterus. Right eye: No discharge. Left eye: No discharge. Conjunctiva/sclera: Conjunctivae normal.   Neck:      Musculoskeletal: Normal range of motion. No muscular tenderness. Cardiovascular:      Rate and Rhythm: Normal rate and regular rhythm. Heart sounds: Normal heart sounds. No murmur. No friction rub. No gallop. Pulmonary:      Effort: Pulmonary effort is normal. No respiratory distress. Breath sounds: Normal breath sounds.  No wheezing or rales.   Abdominal:      General: Bowel sounds are normal. There is no distension. Palpations: Abdomen is soft. There is no mass. Tenderness: There is no abdominal tenderness. There is no guarding. Musculoskeletal:         General: No deformity. Comments: Left lower extremity - ACE dressing intact to surgical site. Clean, dry. SCDs to feet bilaterally  Slight non pitting edema to right ankle   Skin:     General: Skin is warm and dry. Capillary Refill: Capillary refill takes less than 2 seconds. Coloration: Skin is not pale. Findings: No erythema or rash. Neurological:      General: No focal deficit present. Mental Status: She is alert and oriented to person, place, and time. Psychiatric:         Mood and Affect: Mood normal.         Behavior: Behavior normal.         Data Review:   Lab results (past 7 days)  Admission on 02/10/2020   Component Date Value    Glucose (POC) 02/10/2020 105*    [facility]  Brian     Glucose (POC) 02/10/2020 86     Performed by 02/10/2020 [facility]       Results for Blair Jones (MRN [id number]) as of 2/10/2020 22:47   Ref.  Range 1/27/2020 09:08   WBC Latest Ref Range: 3.6 - 11.0 K/uL 5.2   NRBC Latest Ref Range: 0  WBC 0.0   RBC Latest Ref Range: 3.80 - 5.20 M/uL 4.82   HGB Latest Ref Range: 11.5 - 16.0 g/dL 14.5   HCT Latest Ref Range: 35.0 - 47.0 % 44.6   MCV Latest Ref Range: 80.0 - 99.0 FL 92.5   MCH Latest Ref Range: 26.0 - 34.0 PG 30.1   MCHC Latest Ref Range: 30.0 - 36.5 g/dL 32.5   RDW Latest Ref Range: 11.5 - 14.5 % 12.7   PLATELET Latest Ref Range: 150 - 400 K/uL 216   MPV Latest Ref Range: 8.9 - 12.9 FL 12.2   ABSOLUTE NRBC Latest Ref Range: 0.00 - 0.01 K/uL 0.00   Color Latest Units:   YELLOW/STRAW   Appearance Latest Ref Range: CLEAR   CLEAR   Specific gravity Latest Ref Range: 1.003 - 1.030   1.010   pH (UA) Latest Ref Range: 5.0 - 8.0   5.5   Protein Latest Ref Range: NEG mg/dL NEGATIVE Glucose Latest Ref Range: NEG mg/dL NEGATIVE   Ketone Latest Ref Range: NEG mg/dL NEGATIVE   Blood Latest Ref Range: NEG   NEGATIVE   Bilirubin Latest Ref Range: NEG   NEGATIVE   Urobilinogen Latest Ref Range: 0.2 - 1.0 EU/dL 0.2   Nitrites Latest Ref Range: NEG   NEGATIVE   Leukocyte Esterase Latest Ref Range: NEG   NEGATIVE   Epithelial cells Latest Ref Range: FEW /lpf FEW   WBC Latest Ref Range: 0 - 4 /hpf 0-4   RBC Latest Ref Range: 0 - 5 /hpf 0-5   Bacteria Latest Ref Range: NEG /hpf NEGATIVE   Hyaline cast Latest Ref Range: 0 - 5 /lpf 0-2   INR Latest Ref Range: 0.9 - 1.1   1.1   Prothrombin time Latest Ref Range: 9.0 - 11.1 sec 10.7   Sodium Latest Ref Range: 136 - 145 mmol/L 139   Potassium Latest Ref Range: 3.5 - 5.1 mmol/L 4.1   Chloride Latest Ref Range: 97 - 108 mmol/L 106   CO2 Latest Ref Range: 21 - 32 mmol/L 30   Anion gap Latest Ref Range: 5 - 15 mmol/L 3 (L)   Glucose Latest Ref Range: 65 - 100 mg/dL 100   BUN Latest Ref Range: 6 - 20 MG/DL 18   Creatinine Latest Ref Range: 0.55 - 1.02 MG/DL 0.95   BUN/Creatinine ratio Latest Ref Range: 12 - 20   19   Calcium Latest Ref Range: 8.5 - 10.1 MG/DL 8.9   GFR est non-AA Latest Ref Range: >60 ml/min/1.73m2 58 (L)   GFR est AA Latest Ref Range: >60 ml/min/1.73m2 >60   Hemoglobin A1c, (calculated) Latest Ref Range: 4.0 - 5.6 % 5.7 (H)   Est. average glucose Latest Units: mg/dL 117   Crossmatch Expiration Unknown 02/10/2020   TYPE & SCREEN Unknown Rpt     Imaging results (past 24 hours):  No results found. EKG (most recent): Interpreted by myself  1/27/2020 - Sinus bradycardia, rate 52, normal axis/intervals, no ST elevation/depression noted.  No significant change from EKG 12/11/2014    Signed By: Raphael Adams NP     February 10, 2020

## 2020-02-11 NOTE — PROGRESS NOTES
Problem: Falls - Risk of  Goal: *Absence of Falls  Description  Document Janie Gibson Fall Risk and appropriate interventions in the flowsheet.   Outcome: Progressing Towards Goal  Note: Fall Risk Interventions:  Mobility Interventions: Patient to call before getting OOB         Medication Interventions: Patient to call before getting OOB    Elimination Interventions: Call light in reach              Problem: Knee Replacement: Post-Op Day 1  Goal: *Hemodynamically stable  Outcome: Progressing Towards Goal

## 2020-02-11 NOTE — PROGRESS NOTES
Reason for Admission:   Left total knee replacement                   RUR Score:   5%                  Plan for utilizing home health: Yes                      Current Advanced Directive/Advance Care Plan: Not on file. Transition of Care Plan:       CM met with pt and her daughter to introduce them to CM role. This pt lives at home alone. However, her daughter is going to stay with her at d/c. Per pt, she was independent prior to admission. CM informed pt that she has home health orders and offered her freedom of choice. She stated that she would like to use [facility] for her home health. CM sent a referral to [facility] via [facility]. BART Mckenzie,ACLAURI-    Observation notice provided in writing to patient and/or caregiver as well as verbal explanation of the policy. Patients who are in outpatient status also receive the Observation notice. Care Management Interventions  PCP Verified by CM: Yes  Palliative Care Criteria Met (RRAT>21 & CHF Dx)?: No  Transition of Care Consult (CM Consult): Discharge Planning  MyChart Signup: No  Discharge Durable Medical Equipment: No  Physical Therapy Consult: Yes  Occupational Therapy Consult: No  Speech Therapy Consult: No  Current Support Network: Other(Pt's daughter will be staying with her at d/c.)  Confirm Follow Up Transport: Family  The Plan for Transition of Care is Related to the Following Treatment Goals : safe d/c.   The Patient and/or Patient Representative was Provided with a Choice of Provider and Agrees with the Discharge Plan?: Yes  Freedom of Choice List was Provided with Basic Dialogue that Supports the Patient's Individualized Plan of Care/Goals, Treatment Preferences and Shares the Quality Data Associated with the Providers?: Yes  The [employer] Information Provided?: No

## 2020-02-11 NOTE — PROGRESS NOTES
[address] with [facility] noted that [facility] has accepted this pt for home health.  CM placed this information on pt's AVS. Gerald Mcguire

## 2020-02-11 NOTE — PROGRESS NOTES
Problem: Falls - Risk of  Goal: *Absence of Falls  Description  Document Cecelia Washington Fall Risk and appropriate interventions in the flowsheet. Outcome: Progressing Towards Goal  Note: Fall Risk Interventions:  Mobility Interventions: Patient to call before getting OOB         Medication Interventions: Patient to call before getting OOB    Elimination Interventions: Call light in reach              Problem: Patient Education: Go to Patient Education Activity  Goal: Patient/Family Education  Outcome: Progressing Towards Goal     Problem: Knee Replacement: Post-Op Day 1  Goal: Off Pathway (Use only if patient is Off Pathway)  Outcome: Progressing Towards Goal  Goal: Activity/Safety  Outcome: Progressing Towards Goal  Note:   Pt ambulated with physical therapy. Goal: Nutrition/Diet  Outcome: Progressing Towards Goal  Note:   Pt tolerated a regular diet. Goal: Medications  Outcome: Progressing Towards Goal  Goal: Respiratory  Outcome: Progressing Towards Goal  Note:   Pt demonstrated effective use of incentive spirometer. Goal: Treatments/Interventions/Procedures  Outcome: Progressing Towards Goal  Goal: Psychosocial  Outcome: Progressing Towards Goal  Goal: Discharge Planning  Outcome: Progressing Towards Goal  Goal: *Demonstrates progressive activity  Outcome: Progressing Towards Goal  Goal: *Hemodynamically stable  Outcome: Progressing Towards Goal     Problem: Discharge Planning  Goal: *Discharge to safe environment  Description  See Cm notes.    Outcome: Progressing Towards Goal

## 2020-02-11 NOTE — PROGRESS NOTES
Complaints: none  Events: none      GEN:  NAD. AOx3   ABD:  S/NT/ND   LLE:  Dressing C/D/I    5/5 motor    Calf nttp (Bilat)    Sensate all distribution to light touch    1+ dp/pt pulses, foot perfused      Lab Results   Component Value Date/Time    HGB 12.0 02/11/2020 03:33 AM    INR 1.1 01/27/2020 09:08 AM       Lab Results   Component Value Date/Time    Sodium 139 02/11/2020 03:33 AM    Potassium 3.9 02/11/2020 03:33 AM    Chloride 111 (H) 02/11/2020 03:33 AM    CO2 23 02/11/2020 03:33 AM    BUN 11 02/11/2020 03:33 AM    Creatinine 0.74 02/11/2020 03:33 AM    Calcium 8.3 (L) 02/11/2020 03:33 AM            POD #1 LEFT TOTAL KNEE REPLACEMENT. Satisfactory progress. ABX: Complete today  PATHWAY: D/C Cecelia per protocol  DVT Prophylaxis: ASA  Weight Bearing: WBAT LLE   Pain Control: PRN oral narcotics  Anticipated Discharge Date: today  Disposition: Home, PT.

## 2020-02-11 NOTE — DISCHARGE INSTRUCTIONS
Discharge Instructions Knee Replacement  Dr. Josselyn Hensley    Patient Name: Elidia Reed  Date of procedure: 2/10/2020   Procedure: Procedure(s):  LEFT TOTAL KNEE ARTHROPLASTY  Surgeon: Pietro Joel) and Role:     * Phil Osei MD - Primary  PCP: Anselmo Ruiz MD  Date of discharge: No discharge date for patient encounter. Follow up appointments   Follow up with Dr. Josselyn Hensley in 3 weeks. Call [phone] to make an appointment.  If home health has been arranged for you the agency will contact you to arrange dates/times for visits. Please call them if you do not hear from them within 24 hours after you are discharged    When to call your Orthopaedic Surgeon: Call [phone]. If you call after 5pm or on a weekend, the on call physician will be contacted   Unrelieved pain   Signs of infection-if your incision is red; continues to have drainage; drainage has a foul odor or if you have a persistent fever over 101 degrees   Signs of a blood clot in your leg-calf pain, tenderness, redness, swelling of lower leg    When to call your Primary Care Physician:   Concerns about medical conditions such as diabetes, high blood pressure, asthma, congestive heart failure   Call if blood sugars are elevated, persistent headache or dizziness, coughing or congestion, constipation or diarrhea, burning with urination, abnormal heart rate    When to call [id number] go to the nearest emergency room   Sudden onset of chest pain, shortness of breath, difficulty breathing    Activity   Weight bearing as tolerated with walker or crutches.  Refer to your patient handbook for instructions and pictures   Complete your Home Exercise Program daily as instructed by your therapist.  Refer to your handbook for instructions and pictures   Get up every one hour and walk (except at night when sleeping)   Do not drive or operate heavy machinery    Incision Care   The Aquacel (brown, waterproof) surgical dressing is to remain on your knee for 7 days. On the 7th day have someone gently peel the dressing off by carefully lifting the edge and stretching it slightly to break the adhesive seal and leave incision open to air. You may take a shower with the Aquacel dressing in place.  You do have staples in your knee incision; if present they will be removed by the [address] [place] [address] [facility] staff in 10-14 days and steri-strips will be applied. Leave the steri-strips on until they fall off   Once the Aquacel is removed, you may get your incision wet but do not submerge your incision under water in a bath tub, hot tub or swimming pool for 6 weeks after surgery. Preventing blood clots    Take Aspirin as prescribed. Pain management   Keep ice wrap in place except when walking; changing gel packs every 4 hours   Lie down and elevate your leg on pillows for about 30 minutes after walking to decrease swelling and pain   Do ankle pumps (10 repetitions) every hour while awake and get up frequently to walk   Take narcotic pain pill as prescribed if needed. Take with food; avoid alcohol while taking pain medication. Decrease the amount of narcotic pain medication as your pain lessens   Do not take any over-the-counter medication except for Tylenol (acetaminophen). Please be aware that many medications contain Tylenol. We do not want you to take too much Tylenol so please use this as your guide:  o Do not take more than 3 Grams of Tylenol in a 24 hour period. (There are 1000 milligrams in one Gram  o 325mg of Tylenol per tablet (do not take more than 9 tablets in 24 hours)  o 500mg of Tylenol per tablet (do not take more than 6 tablets in 24 hours)    Diet   Resume usual diet; drink plenty of fluids; eat foods high in fiber   Take over-the-counter stool softeners and laxatives to prevent constipation.

## 2020-02-11 NOTE — PROGRESS NOTES
The Joint Replacement Discharge Education video was reviewed by the patient/family. The content was discussed utilizing teach back, questions were answered. The patient verbalized an understanding of the instructions. I have reviewed discharge instructions with the patient and daughter. The patient and daughter verbalized understanding. Pt received rxs from outpatient pharmacy. Pt was d/c home via wheelchair by volunteers.

## 2020-02-11 NOTE — PROGRESS NOTES
BP stable  Resume home benzpril -->  Lisinopril per rpotocol  Cont Toprol XL   Stop IVF if not required  We will sign off   Thx for the consult

## 2020-02-11 NOTE — NURSE NAVIGATOR
[address]  [facility]     FROM:                                TO: [facility] [address]                                                      ([address] [place] [address] or Facility name)  Rachel Britton [address]  [address] [place] [address]  Dept: [address]: [phone]                                      Room#:  570/01                                                       Nurse Navigator:  Turner Luis RN         SITUATION      ASAScore: ASA 2 - Patient with mild systemic disease with no functional limitations    Admitted:  2/10/2020  Hospital Day: 2      Attending Provider:  No att. providers found     Consultations:  IP CONSULT TO HOSPITALIST    PCP:  Larisa James MD   [phone]     Admitting Dx: Left knee DJD [M17.12]       Active Problems:    Left knee DJD (2/10/2020)      1 Day Post-Op of   Procedure(s):  LEFT TOTAL KNEE ARTHROPLASTY   BY: Jane Lemon MD             ON: 2/10/2020                  Code Status: Full Code             Advance Directive? Not Received (Send w/patient)     Isolation:  There are currently no Active Isolations       MDRO: No current active infections    BACKGROUND     Allergies:   Allergies   Allergen Reactions    Amlodipine Swelling    Cefuroxime Other (comments)     Tightness in abd       Past Medical History:   Diagnosis Date    Arthritis     Bruises easily     Diverticulosis     Hypertension     Thyroid disease     HYPOTHYROIDISM       Past Surgical History:   Procedure Laterality Date    HX APPENDECTOMY      HX CARPAL TUNNEL RELEASE Bilateral     HX CATARACT REMOVAL Bilateral 01/18/2017    HX COLONOSCOPY      HX GYN      hysterectomy    HX GYN      vaginal delivery x 1    HX OOPHORECTOMY Bilateral     HX ORTHOPAEDIC Left     TRIGGER FINGER RELEASE    HX ORTHOPAEDIC Right     carpal tunnel    HX ORTHOPAEDIC Left        Prior to Admission Medications   Prescriptions Last Dose Informant Patient Reported? Taking? PSYLLIUM SEED, WITH SUGAR, (METAMUCIL PO) 2/7/2020 at Unknown time  Yes Yes   Sig: Take  by mouth as needed. ascorbic acid, vitamin C, (VITAMIN C) 500 mg tablet 1/27/2020 at Unknown time  Yes Yes   Sig: Take 500 mg by mouth daily. benazepriL (LOTENSIN) 20 mg tablet 2/9/2020 at Unknown time  Yes Yes   Sig: Take 20 mg by mouth daily. ketotifen (ZADITOR) 0.025 % (0.035 %) ophthalmic solution 2/9/2020 at Unknown time  Yes Yes   Sig: Administer 1 Drop to both eyes two (2) times a day. levothyroxine (SYNTHROID) 75 mcg tablet 2/10/2020 at 0600  Yes Yes   Sig: Take 75 mcg by mouth Daily (before breakfast). metoprolol succinate (TOPROL XL) 50 mg XL tablet 2/10/2020 at 0630  Yes Yes   Sig: Take 50 mg by mouth daily. olopatadine (PATADAY) 0.2 % drop ophthalmic solution Unknown at Unknown time  Yes No   Sig: Administer 1 Drop to both eyes two (2) times daily as needed. Facility-Administered Medications: None       Vaccinations: There is no immunization history on file for this patient. ASSESSMENT   Age: 76 y.o.              Gender: female        Height: Height: 5' 3\" (160 cm)                    Weight:Weight: 74.9 kg (165 lb 2 oz)     Patient Vitals for the past 8 hrs:   Temp Pulse Resp BP SpO2   02/11/20 0824 98.3 °F (36.8 °C) 70 16 133/60 92 %            Active Orders   Diet    DIET REGULAR       Orientation: Orientation Level: Oriented X4    Active Lines/Drains:  (Peg Tube / Rai / CL or S/L?):no    Urinary Status: Voiding      Last BM: Last Bowel Movement Date: 02/10/20     Skin Integrity: Incision (comment)(left knee)             Mobility: Slightly limited   Weight Bearing Status: WBAT (Weight Bearing as Tolerated)      Gait Training  Assistive Device: Walker, rolling, Gait belt  Ambulation - Level of Assistance: Stand-by assistance  Distance (ft): 300 Feet (ft)(150 ft x 2 (seated rest in between))  Stairs - Level of Assistance: Stand-by assistance  Number of Stairs Trained: 4  Rail Use: Both     On Anticoagulation? YES  Aspirin  325 mg BID                                       Pain Medications given:  Oxycodone; celebrex                                   Lab Results   Component Value Date/Time    Glucose 136 (H) 02/11/2020 03:33 AM    Hemoglobin A1c 5.7 (H) 01/27/2020 09:08 AM    INR 1.1 01/27/2020 09:08 AM    HGB 12.0 02/11/2020 03:33 AM    HGB 14.5 01/27/2020 09:08 AM       Readmission Risks:  Score:         RECOMMENDATION     See After Visit Summary (AVS) for:  · Discharge instructions  · After [address]   · Medication Reconciliation          [facility] Orthopaedic Nurse Navigator  JULIOCESAR Nur, RN-BC       Office  [phone]  Cell      [phone]  Fax      [phone]  [email or web address]             . Phy

## 2020-02-11 NOTE — PROGRESS NOTES
Problem: Mobility Impaired (Adult and Pediatric)  Goal: *Acute Goals and Plan of Care (Insert Text)  Description  FUNCTIONAL STATUS PRIOR TO ADMISSION: Patient was independent and active without use of DME.    HOME SUPPORT PRIOR TO ADMISSION: The patient lived alone with daughter to provide assistance if needed. Physical Therapy Goals  Initiated 2/10/2020    1. Patient will move from supine to sit and sit to supine , scoot up and down and roll side to side in bed with modified independence within 4 days. 2. Patient will perform sit to stand with modified independence within 4 days. 3. Patient will ambulate with modified independence for 150 feet with the least restrictive device within 4 days. 4. Patient will ascend/descend 4 stairs with cane and one handrail(s) with modified independence within 4 days. 5. Patient will perform home exercise program per protocol with independence within 4 days. 6. Patient will demonstrate AROM 0-90 degrees in operative joint within 4 days. 2/11/2020 0942 by Jose Alejandro Anguiano  Outcome: Resolved/Met   PHYSICAL THERAPY TREATMENT/DISCHARGE  Patient: Elier Diaz (89 y.o. female)  Date: 2/11/2020  Diagnosis: Left knee DJD [M17.12]   <principal problem not specified>  Procedure(s) (LRB):  LEFT TOTAL KNEE ARTHROPLASTY (Left) 1 Day Post-Op  Precautions: Fall, WBAT  Chart, physical therapy assessment, plan of care and goals were reviewed. ASSESSMENT  Patient continues with skilled PT services and has met acute care PT goals. Patient is cleared for discharge from PT standpoint. Discharge instructions with regards to post-op home program reviewed. Patient and daughter demonstrated understanding. She will benefit from [facility] PT in order to achieve maximum level of safe, functional mobility, balance and return to independent PLOF. Patient attended pre-op JOINT CLASS, is independent with post-op TKA exercise protocol and has same in written, illlustrated form. Other factors to consider for discharge: Motivated/A & O x 4/Supportive Family/Independent PLOF          PLAN :  Patient will be discharged from acute skilled physical therapy at this time. Rationale for discharge:  Goals achieved    Recommendation for discharge: (in order for the patient to meet his/her long term goals)  Physical therapy at least 2 days/week in the home     This discharge recommendation:  Has been made in collaboration with the attending provider and/or case management    IF patient discharges home will need the following DME: patient owns DME required for discharge       SUBJECTIVE:   Patient stated I am doing well.     OBJECTIVE DATA SUMMARY:   Critical Behavior:  Neurologic State: (P) Alert  Orientation Level: (P) Oriented X4  Cognition: (P) Appropriate for age attention/concentration     Functional Mobility Training:  Bed Mobility:     Supine to Sit: Stand-by assistance  Sit to Supine: Stand-by assistance  Scooting: Stand-by assistance        Transfers:  Sit to Stand: Stand-by assistance  Stand to Sit: Stand-by assistance                             Balance:  Sitting: Intact  Standing: Intact; With support  Standing - Static: Good;Constant support  Standing - Dynamic : Good;Constant support  Ambulation/Gait Training:  Distance (ft): 300 Feet (ft)(150 ft x 2 (seated rest in between))  Assistive Device: Walker, rolling;Gait belt  Ambulation - Level of Assistance: Stand-by assistance        Gait Abnormalities: Antalgic     Left Side Weight Bearing: As tolerated  Base of Support: Shift to right     Speed/Shell: Slow          Stairs:  Number of Stairs Trained: 4  Stairs - Level of Assistance: Stand-by assistance   Rail Use: Both    Therapeutic Exercises:   Patient attended pre-op JOINT CLASS, is independent with post-op TKA exercise protocol and has same in written, illlustrated form. Pain Ratin/10-well-managed with current medication regimen.     Activity Tolerance:   Good  Please refer to the flowsheet for vital signs taken during this treatment. After treatment patient left in no apparent distress:   Supine in bed, Call bell within reach, Caregiver / family present, Side rails x 3, and nurse notified.      COMMUNICATION/COLLABORATION:   The patients plan of care was discussed with: Registered Nurse and     Macy Rios   Time Calculation: 30 mins

## 2020-02-12 ENCOUNTER — PATIENT OUTREACH (OUTPATIENT)
Dept: CASE MANAGEMENT | Age: 75
End: 2020-02-12

## 2020-02-12 NOTE — PROGRESS NOTES
This note will not be viewable in [address] Post Discharge Follow-up contact after Joint Replacement    Patient discharged on 2/11/20  By  Halina Grace   following  left knee Arthroplasty. Spoke with patient's daughter today, who reports that \" my mother is working with the therapist right now, but I am pleased with how well she is doing. \"  Denies Fever, Shortness of Breath or Chest Pain. Home Health has visited. Patient also reports:  Incision  clean, dry, intact  Calf is non-tender, operative extremity has moderate swelling. Pain is well managed. Discussed use of ice & elevation. Patient is progressing with therapy and is exercising independently. Taking Aspirin for anticoagulation, oxycodone for pain. Patient is not experiencing symptoms of constipation & urinating without difficulty. Discussed side effects of anticoagulants & pain medications (bleeding/bruising, constipation, lightheaded/dizziness)  Follow up appointment is not scheduled; but patient's daughter states plan to schedule. Discussed calling surgeon Nano Hayes  for drainage, bleeding, swelling in operative extremity, fever or pain. Discussed calling PCP Dr Wilda Starr with other medical issues.

## 2021-11-10 RX ORDER — ACETAMINOPHEN 500 MG
TABLET ORAL
COMMUNITY

## 2021-11-10 NOTE — PERIOP NOTES
[facility]  Ambulatory Surgery Unit  Pre-operative Instructions    Procedure Date  November 12, 2021            Tentative Arrival Time TBD      1. On the day of your procedure, please report to the Ambulatory Surgery Unit Registration Desk and sign in at your designated time. The Ambulatory Surgery Unit is located in [facility] on the [facility] side of the [facility] across from the [address] [place] [address]. Please have all of your health insurance cards and a photo ID. 2. You must have someone with you to drive you home as directed by your surgeon. 3. You may have a light breakfast and take normal morning medications. 4. We recommend you do not drink any alcoholic beverages for 24 hours before and after your procedure. 5. Contact your surgeons office for instructions on the following medications: non-steroidal anti-inflammatory drugs (i.e. Advil, Aleve), vitamins, and supplements. (Some surgeons will want you to stop these medications prior to surgery and others may allow you to take them)   **If you are currently taking Plavix, Coumadin, Aspirin and/or other blood-thinning agents, contact your surgeon for instructions. ** Your surgeon will partner with the physician prescribing these medications to determine if it is safe to stop or if you need to continue taking. Please do not stop taking these medications without instructions from your surgeon. 6. In an effort to help prevent surgical site infection, we ask that you shower with an anti-bacterial soap (i.e. Dial or Safeguard) on the morning of your procedure. Do not apply any lotions, powders, or deodorants after showering. 7. Wear comfortable clothes. Wear glasses instead of contacts. Do not bring any jewelry or money (other than copays or fees as instructed). Do not wear make-up, particularly mascara, the morning of your procedure. Wear your hair loose or down, no ponytails, buns, malcolm pins or clips.  All body piercings must be removed. 8. You should understand that if you do not follow these instructions your procedure may be cancelled. If your physical condition changes (i.e. fever, cold or flu) please contact your surgeon as soon as possible. 9. It is important that you be on time. If a situation occurs where you may be late, or if you have any questions or problems, please call [phone].    10. Your procedure time may be subject to change. You will receive a phone call the day prior to confirm your arrival time. I understand a pre-operative phone call will be made to verify my procedure time. In the event that I am not available, I give permission for a message to be left on my answering service and/or with another person?       yes    Preop instructions reviewed  Pt verbalized understanding.      ___________________      ___________________      ___________________  (Signature of Patient)          (Witness)                   (Date and Time)

## 2021-11-11 PROBLEM — H25.812 COMBINED FORMS OF AGE-RELATED CATARACT OF LEFT EYE: Status: RESOLVED | Noted: 2017-01-18 | Resolved: 2021-11-11

## 2021-11-11 PROBLEM — H26.492 POSTERIOR CAPSULAR OPACIFICATION VISUALLY SIGNIFICANT OF LEFT EYE: Status: ACTIVE | Noted: 2021-11-11

## 2021-11-11 PROBLEM — H26.491 POSTERIOR CAPSULAR OPACIFICATION VISUALLY SIGNIFICANT OF RIGHT EYE: Status: ACTIVE | Noted: 2021-11-11

## 2021-11-12 ENCOUNTER — HOSPITAL ENCOUNTER (OUTPATIENT)
Age: 76
Setting detail: OUTPATIENT SURGERY
Discharge: HOME | End: 2021-11-12
Attending: OPHTHALMOLOGY | Admitting: OPHTHALMOLOGY
Payer: MEDICARE

## 2021-11-12 VITALS
OXYGEN SATURATION: 98 % | BODY MASS INDEX: 29.06 KG/M2 | WEIGHT: 164 LBS | HEIGHT: 63 IN | HEART RATE: 63 BPM | SYSTOLIC BLOOD PRESSURE: 192 MMHG | RESPIRATION RATE: 16 BRPM | DIASTOLIC BLOOD PRESSURE: 76 MMHG | TEMPERATURE: 99 F

## 2021-11-12 PROCEDURE — 74011250637 HC RX REV CODE- 250/637: Performed by: OPHTHALMOLOGY

## 2021-11-12 PROCEDURE — 74011000250 HC RX REV CODE- 250: Performed by: OPHTHALMOLOGY

## 2021-11-12 PROCEDURE — 76030000017 HC AMB SURG FIRST 0.5 HR INTENSV-TIER 1: Performed by: OPHTHALMOLOGY

## 2021-11-12 PROCEDURE — 74011000250 HC RX REV CODE- 250

## 2021-11-12 RX ORDER — TROPICAMIDE 10 MG/ML
SOLUTION/ DROPS OPHTHALMIC
Status: COMPLETED
Start: 2021-11-12 | End: 2021-11-12

## 2021-11-12 RX ORDER — PROPARACAINE HYDROCHLORIDE 5 MG/ML
1 SOLUTION/ DROPS OPHTHALMIC ONCE
Status: COMPLETED | OUTPATIENT
Start: 2021-11-12 | End: 2021-11-12

## 2021-11-12 RX ORDER — TROPICAMIDE 10 MG/ML
1 SOLUTION/ DROPS OPHTHALMIC ONCE
Status: COMPLETED | OUTPATIENT
Start: 2021-11-12 | End: 2021-11-12

## 2021-11-12 RX ADMIN — BALANCED SALT SOLUTION 20 DROP: 6.4; .75; .48; .3; 3.9; 1.7 SOLUTION OPHTHALMIC at 15:15

## 2021-11-12 RX ADMIN — HYPROMELLOSE 2910 (4000 MPA.S) 1 DROP: 25 SOLUTION/ DROPS OPHTHALMIC at 15:11

## 2021-11-12 RX ADMIN — TROPICAMIDE 1 DROP: 10 SOLUTION/ DROPS OPHTHALMIC at 14:59

## 2021-11-12 RX ADMIN — PROPARACAINE HYDROCHLORIDE 1 DROP: 5 SOLUTION/ DROPS OPHTHALMIC at 14:59

## 2021-11-12 RX ADMIN — FLUOROMETHOLONE 1 DROP: 2.5 SUSPENSION/ DROPS OPHTHALMIC at 15:16

## 2021-11-12 NOTE — OP NOTES
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 ANESTHESIA: Topical.    LASER PARAMETERS:  Power:  2.5 millijoules per shot. Total shots delivered:  85    Estimated blood loss:None  Complications:None  Specimen removed: None    DESCRIPTION OF PROCEDURE: The patient was brought to the holding area where she received several instillations of Mydriacyl 1%, Antelmo-Synephrine 2.5%, and tetracaine until adequate pupillary dilatation was achieved. The patient's vital signs were recorded. The patient was then brought to the laser suite and received 1 drop of tetracaine preoperatively. The patient was then seated at the laser and the Darryl capsulotomy lens was placed on the eye. The patient's posterior capsular opacification was then cleared utilizing the laser. Following this the eye was rinsed with BSS solution to remove the Goniosol solution from the surface of the eye, and the patient received 1 drop of fluorometholone drops in the operated eye. Iopidine was used at the discretion of the surgeon depending on the amount of energy necessary to clear the opacified capsule. Instructions were given to the patient verbally and written to use her FML drops 3 times a day at 9 a.m., 3 p.m., and 9 p.m. for the next 3 days. The patient will be following up with us postoperatively in the office.     [address] [place] [address]  11/12/2021

## 2021-11-15 NOTE — PERIOP NOTES
[facility]  Ambulatory Surgery Unit  Pre-operative Instructions    Procedure Date  Wednesday, November 17, 2021            Tentative Arrival Time TBD      1. On the day of your procedure, please report to the Ambulatory Surgery Unit Registration Desk and sign in at your designated time. The Ambulatory Surgery Unit is located in [facility] on the [facility] side of the [facility] across from the [address] [place] [address]. Please have all of your health insurance cards and a photo ID. 2. You must have someone with you to drive you home as directed by your surgeon. 3. You may have a light breakfast and take normal morning medications. 4. We recommend you do not drink any alcoholic beverages for 24 hours before and after your procedure. 5. Contact your surgeons office for instructions on the following medications: non-steroidal anti-inflammatory drugs (i.e. Advil, Aleve), vitamins, and supplements. (Some surgeons will want you to stop these medications prior to surgery and others may allow you to take them)   **If you are currently taking Plavix, Coumadin, Aspirin and/or other blood-thinning agents, contact your surgeon for instructions. ** Your surgeon will partner with the physician prescribing these medications to determine if it is safe to stop or if you need to continue taking. Please do not stop taking these medications without instructions from your surgeon. 6. In an effort to help prevent surgical site infection, we ask that you shower with an anti-bacterial soap (i.e. Dial or Safeguard) on the morning of your procedure. Do not apply any lotions, powders, or deodorants after showering. 7. Wear comfortable clothes. Wear glasses instead of contacts. Do not bring any jewelry or money (other than copays or fees as instructed). Do not wear make-up, particularly mascara, the morning of your procedure. Wear your hair loose or down, no ponytails, buns, malcolm pins or clips.  All body piercings must be removed. 8. You should understand that if you do not follow these instructions your procedure may be cancelled. If your physical condition changes (i.e. fever, cold or flu) please contact your surgeon as soon as possible. 9. It is important that you be on time. If a situation occurs where you may be late, or if you have any questions or problems, please call [phone].    10. Your procedure time may be subject to change. You will receive a phone call the day prior to confirm your arrival time. I understand a pre-operative phone call will be made to verify my procedure time. In the event that I am not available, I give permission for a message to be left on my answering service and/or with another person?       yes    Preop instructions reviewed  Pt verbalized understanding.      ___________________      ___________________      ___________________  (Signature of Patient)          (Witness)                   (Date and Time)

## 2021-11-17 ENCOUNTER — HOSPITAL ENCOUNTER (OUTPATIENT)
Age: 76
Setting detail: OUTPATIENT SURGERY
Discharge: HOME | End: 2021-11-17
Attending: OPHTHALMOLOGY | Admitting: OPHTHALMOLOGY
Payer: MEDICARE

## 2021-11-17 VITALS
RESPIRATION RATE: 16 BRPM | HEART RATE: 58 BPM | WEIGHT: 164 LBS | TEMPERATURE: 98 F | SYSTOLIC BLOOD PRESSURE: 203 MMHG | DIASTOLIC BLOOD PRESSURE: 86 MMHG | BODY MASS INDEX: 29.06 KG/M2 | HEIGHT: 63 IN | OXYGEN SATURATION: 100 %

## 2021-11-17 PROCEDURE — 74011000250 HC RX REV CODE- 250: Performed by: OPHTHALMOLOGY

## 2021-11-17 PROCEDURE — 74011000250 HC RX REV CODE- 250

## 2021-11-17 PROCEDURE — 74011250637 HC RX REV CODE- 250/637: Performed by: OPHTHALMOLOGY

## 2021-11-17 PROCEDURE — 76030000017 HC AMB SURG FIRST 0.5 HR INTENSV-TIER 1: Performed by: OPHTHALMOLOGY

## 2021-11-17 RX ORDER — TROPICAMIDE 10 MG/ML
1 SOLUTION/ DROPS OPHTHALMIC ONCE
Status: COMPLETED | OUTPATIENT
Start: 2021-11-17 | End: 2021-11-17

## 2021-11-17 RX ORDER — PROPARACAINE HYDROCHLORIDE 5 MG/ML
1 SOLUTION/ DROPS OPHTHALMIC ONCE
Status: COMPLETED | OUTPATIENT
Start: 2021-11-17 | End: 2021-11-17

## 2021-11-17 RX ORDER — TROPICAMIDE 10 MG/ML
SOLUTION/ DROPS OPHTHALMIC
Status: COMPLETED
Start: 2021-11-17 | End: 2021-11-17

## 2021-11-17 RX ADMIN — PROPARACAINE HYDROCHLORIDE 1 DROP: 5 SOLUTION/ DROPS OPHTHALMIC at 06:49

## 2021-11-17 RX ADMIN — BALANCED SALT SOLUTION 20 DROP: 6.4; .75; .48; .3; 3.9; 1.7 SOLUTION OPHTHALMIC at 07:46

## 2021-11-17 RX ADMIN — TROPICAMIDE 1 DROP: 10 SOLUTION/ DROPS OPHTHALMIC at 06:49

## 2021-11-17 RX ADMIN — HYPROMELLOSE 2910 (4000 MPA.S) 1 DROP: 25 SOLUTION/ DROPS OPHTHALMIC at 07:45

## 2021-11-17 RX ADMIN — FLUOROMETHOLONE 1 DROP: 2.5 SUSPENSION/ DROPS OPHTHALMIC at 07:47

## 2021-11-17 NOTE — OP NOTES
Name:  Marjorie Goldman [facility]       updated  3/1/13     Description:  YAG laser capsulotomy      PREOPERATIVE DIAGNOSIS: Visually impairing posterior capsular opacification Left eye    POSTOPERATIVE DIAGNOSIS: Same    OPERATION: YAG laser capsulotomy,[place]. ANESTHESIA: Topical.    LASER PARAMETERS:  Power:  2.4 millijoules per shot. Total shots delivered:  43    Estimated blood loss:None  Complications:None  Specimen removed: None    DESCRIPTION OF PROCEDURE: The patient was brought to the holding area where she received several instillations of Mydriacyl 1%, Antelmo-Synephrine 2.5%, and tetracaine until adequate pupillary dilatation was achieved. The patient's vital signs were recorded. The patient was then brought to the laser suite and received 1 drop of tetracaine preoperatively. The patient was then seated at the laser and the Darryl capsulotomy lens was placed on the eye. The patient's posterior capsular opacification was then cleared utilizing the laser. Following this the eye was rinsed with BSS solution to remove the Goniosol solution from the surface of the eye, and the patient received 1 drop of fluorometholone drops in the operated eye. Iopidine was used at the discretion of the surgeon depending on the amount of energy necessary to clear the opacified capsule. Instructions were given to the patient verbally and written to use her FML drops 3 times a day at 9 a.m., 3 p.m., and 9 p.m. for the next 3 days. The patient will be following up with us postoperatively in the office.     [address] [place] [address]  11/17/2021

## 2022-03-18 PROBLEM — H26.492 POSTERIOR CAPSULAR OPACIFICATION VISUALLY SIGNIFICANT OF LEFT EYE: Status: ACTIVE | Noted: 2021-11-11

## 2022-03-18 PROBLEM — M17.12 LEFT KNEE DJD: Status: ACTIVE | Noted: 2020-02-10

## 2022-03-19 PROBLEM — H26.491 POSTERIOR CAPSULAR OPACIFICATION VISUALLY SIGNIFICANT OF RIGHT EYE: Status: ACTIVE | Noted: 2021-11-11

## 2024-08-26 ENCOUNTER — TRANSCRIBE ORDERS (OUTPATIENT)
Facility: HOSPITAL | Age: 79
End: 2024-08-26

## 2024-08-26 ENCOUNTER — HOSPITAL ENCOUNTER (OUTPATIENT)
Facility: HOSPITAL | Age: 79
Discharge: HOME | End: 2024-08-29
Payer: MEDICARE

## 2024-08-26 DIAGNOSIS — S90.31XA CONTUSION OF RIGHT FOOT, INITIAL ENCOUNTER: ICD-10-CM

## 2024-08-26 DIAGNOSIS — S90.31XA CONTUSION OF RIGHT FOOT, INITIAL ENCOUNTER: Primary | ICD-10-CM

## 2024-08-26 PROCEDURE — 73630 X-RAY EXAM OF FOOT: CPT

## 2024-10-24 ENCOUNTER — PROCEDURE VISIT (OUTPATIENT)
Age: 79
End: 2024-10-24
Payer: MEDICARE

## 2024-10-24 DIAGNOSIS — R20.2 NUMBNESS AND TINGLING IN BOTH HANDS: ICD-10-CM

## 2024-10-24 DIAGNOSIS — M25.532 PAIN IN BOTH WRISTS: ICD-10-CM

## 2024-10-24 DIAGNOSIS — G56.02 LEFT CARPAL TUNNEL SYNDROME: Primary | ICD-10-CM

## 2024-10-24 DIAGNOSIS — R20.0 NUMBNESS AND TINGLING IN BOTH HANDS: ICD-10-CM

## 2024-10-24 DIAGNOSIS — M25.531 PAIN IN BOTH WRISTS: ICD-10-CM

## 2024-10-24 PROCEDURE — 95913 NRV CNDJ TEST 13/> STUDIES: CPT | Performed by: PSYCHIATRY & NEUROLOGY

## 2024-10-24 PROCEDURE — 95886 MUSC TEST DONE W/N TEST COMP: CPT | Performed by: PSYCHIATRY & NEUROLOGY

## 2024-10-24 NOTE — PROGRESS NOTES
ELECTRODIANOSTIC REPORT  Test Date:  10/24/2024    Patient: Lexis Gómez : 1945 Physician: Dr. Rodríguez   Sex: Female Tech: Ramona Burnett, EMG Technician  Ref Phys: Dr. Joiner     CHIEF COMPLAINTS:  Patient is a 79 year-old female who presents with sensory disturbance and pain in her hands, worse on the left.  She does have a history of prior carpal tunnel syndrome status post surgery as well as cyst removal.  This was approximately 10 years ago.  Her examination does reveal mild sensory loss in the median distribution that is present on the left..  She does have wrist pain    EMG & NCV Findings:    Nerve conduction studies as listed below were normal for the bilateral radial sensory and ulnar sensory.  There is no significant side-to-side difference.  The right median sensory was normal.  The left median sensory revealed prolongation of the peak latency.  The bilateral ulnar motor studies and median motor studies were normal with no significant side-to-side difference.  The left median mixed nerve study revealed prolongation of the peak latency.    Disposable concentric needle examination of the muscles listed below in the left upper extremity were normal.    Impression:    This study was abnormal.  There is electrodiagnostic evidence upon today's examination suggestin.  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    2.  There is no evidence of other focal neuropathy in the bilateral upper extremities, myopathy, or cervical radiculopathy.      __________________  Piero Rodríguez D.O. FAAN        Nerve Conduction Studies  Anti Sensory Summary Table     Stim Site NR Peak (ms) Norm Peak (ms) O-P Amp (µV) Norm O-P Amp Site1 Site2 Dist (cm)   Left Median Anti Sensory (2nd Digit)  33.5 °C   Wrist    4.2 <4 43.9 >11 Wrist 2nd Digit 14.0   Right Median Anti Sensory (2nd Digit)  33.5 °C   Wrist    3.5 <4 29.2 >11 Wrist 2nd Digit 14.0   Disp Refills Start End    levothyroxine 75 MCG tablet 90 tablet 1 3/27/2023     Sig - Route: Take 1 tablet by mouth daily. - Oral    Sent to pharmacy as: Levothyroxine Sodium 75 MCG Oral Tablet    Class: Eprescribe    E-Prescribing Status: Receipt confirmed by pharmacy (3/27/2023 12:50 PM CDT)

## (undated) DEVICE — 3M™ IOBAN™ 2 ANTIMICROBIAL INCISE DRAPE 6651EZ: Brand: IOBAN™ 2

## (undated) DEVICE — DRAPE,EXTREMITY,89X128,STERILE: Brand: MEDLINE

## (undated) DEVICE — PADDING CAST SPEC 6INX4YD COT --

## (undated) DEVICE — KENDALL DL ECG CABLE AND LEAD WIRE SYSTEM, 3-LEAD, SINGLE PATIENT USE: Brand: KENDALL

## (undated) DEVICE — SCRUB DRY SURG EZ SCRUB BRUSH PREOPERATIVE GRN

## (undated) DEVICE — REM POLYHESIVE ADULT PATIENT RETURN ELECTRODE: Brand: VALLEYLAB

## (undated) DEVICE — 4-PORT MANIFOLD: Brand: NEPTUNE 2

## (undated) DEVICE — ZIMMER® STERILE DISPOSABLE TOURNIQUET CUFF WITH PLC, DUAL PORT, SINGLE BLADDER, 34 IN. (86 CM)

## (undated) DEVICE — HIGH FLOW RATE EXTENSION SET, LUER LOCK ADAPTERS

## (undated) DEVICE — HANDPIECE SET WITH BONE CLEANING TIP AND SUCTION TUBE: Brand: INTERPULSE

## (undated) DEVICE — BLADE SAW W073XL276IN THK0031IN CUT THK0036IN REPL SAG

## (undated) DEVICE — SURGICAL PROCEDURE PACK CATRCT CUST

## (undated) DEVICE — THE MONARCH® "D" CARTRIDGE IS A SINGLE-USE POLYPROPYLENE CARTRIDGE FOR POSTERIOR CHAMBER IOL DELIVERY: Brand: MONARCH® III

## (undated) DEVICE — SYR 5ML 1/5 GRAD LL NSAF LF --

## (undated) DEVICE — TOTAL TRAY, 16FR 10ML SIL FOLEY, URN: Brand: MEDLINE

## (undated) DEVICE — TAPE,CLOTH/SILK,CURAD,3"X10YD,LF,40/CS: Brand: CURAD

## (undated) DEVICE — STERILE POLYISOPRENE POWDER-FREE SURGICAL GLOVES WITH EMOLLIENT COATING: Brand: PROTEXIS

## (undated) DEVICE — Device

## (undated) DEVICE — SOLUTION IRRIG 1000ML H2O STRL BLT

## (undated) DEVICE — SYR 20ML LL STRL LF --

## (undated) DEVICE — (D)SYR 10ML 1/5ML GRAD NSAF -- PKGING CHANGE USE ITEM 338027

## (undated) DEVICE — PADDING CST 6IN STERILE --

## (undated) DEVICE — NEEDLE HYPO 18GA L1.5IN PNK S STL HUB POLYPR SHLD REG BVL

## (undated) DEVICE — SUTURE VCRL SZ 2 L54IN ABSRB UD L65MM TP-1 1/2 CIR J880T

## (undated) DEVICE — 3M™ TEGADERM™ TRANSPARENT FILM DRESSING FRAME STYLE, 1624W, 2-3/8 IN X 2-3/4 IN (6 CM X 7 CM), 100/CT 4CT/CASE: Brand: 3M™ TEGADERM™

## (undated) DEVICE — SUTURE VCRL SZ 0 L36IN ABSRB VLT L40MM CT 1/2 CIR J358H

## (undated) DEVICE — SOLUTION IRRIG 3000ML 0.9% SOD CHL FLX CONT 0797208] ICU MEDICAL INC]

## (undated) DEVICE — SOLUTION IV STRL H2O 500 ML AQUALITE POUR BTL

## (undated) DEVICE — BANDAGE COMPR ELASTIC 5 YDX6 IN

## (undated) DEVICE — SUTURE VCRL SZ 2-0 L36IN ABSRB UD L40MM CT 1/2 CIR J957H

## (undated) DEVICE — SOLUTION LACTATED RINGERS INJECTION USP

## (undated) DEVICE — STERILE POLYISOPRENE POWDER-FREE SURGICAL GLOVES: Brand: PROTEXIS

## (undated) DEVICE — SYRINGE INSULIN 1ML LUERSLIP TIP W/O SFTY U100 BLISTER PK

## (undated) DEVICE — SYR 3ML LL TIP 1/10ML GRAD --

## (undated) DEVICE — INFECTION CONTROL KIT SYS

## (undated) DEVICE — BOWL BNE CEM MIX SPAT CURET SMARTMIX CTS

## (undated) DEVICE — SUTURE MCRYL SZ 4-0 L27IN ABSRB UD L24MM PS-1 3/8 CIR PRIM Y935H

## (undated) DEVICE — T4 HOOD

## (undated) DEVICE — SOLUTION IV 250ML 0.9% SOD CHL CLR INJ FLX BG CONT PRT CLSR

## (undated) DEVICE — NDL FLTR TIP 5 MIC 18GX1.5IN --

## (undated) DEVICE — BANDAGE COMPR M W6INXL10YD WHT BGE VELC E MTRX HK AND LOOP

## (undated) DEVICE — PREP SKN PREVAIL 40ML APPL --

## (undated) DEVICE — CONTAINER,SPECIMEN,3OZ,OR STRL: Brand: MEDLINE

## (undated) DEVICE — SUTURE STRATAFIX SYMMETRIC PDS + SZ 1 L18IN ABSRB VLT L48MM SXPP1A400

## (undated) DEVICE — STRAP,POSITIONING,KNEE/BODY,FOAM,4X60": Brand: MEDLINE

## (undated) DEVICE — Z DISCONTINUED USE 2744636  DRESSING AQUACEL 14 IN ALG W3.5XL14IN POLYUR FLM CVR W/ HYDRCOLL